# Patient Record
Sex: MALE | URBAN - METROPOLITAN AREA
[De-identification: names, ages, dates, MRNs, and addresses within clinical notes are randomized per-mention and may not be internally consistent; named-entity substitution may affect disease eponyms.]

---

## 2018-01-25 ENCOUNTER — OFFICE VISIT (OUTPATIENT)
Dept: FAMILY MEDICINE CLINIC | Age: 68
End: 2018-01-25

## 2018-01-25 ENCOUNTER — HOME HEALTH ADMISSION (OUTPATIENT)
Dept: HOME HEALTH SERVICES | Facility: HOME HEALTH | Age: 68
End: 2018-01-25
Payer: COMMERCIAL

## 2018-01-25 VITALS
TEMPERATURE: 97.7 F | OXYGEN SATURATION: 99 % | HEIGHT: 66 IN | HEART RATE: 105 BPM | BODY MASS INDEX: 18.8 KG/M2 | WEIGHT: 117 LBS | SYSTOLIC BLOOD PRESSURE: 134 MMHG | DIASTOLIC BLOOD PRESSURE: 85 MMHG | RESPIRATION RATE: 17 BRPM

## 2018-01-25 DIAGNOSIS — L03.114 LEFT ARM CELLULITIS: ICD-10-CM

## 2018-01-25 DIAGNOSIS — I10 ESSENTIAL HYPERTENSION: Primary | ICD-10-CM

## 2018-01-25 DIAGNOSIS — Z51.89 VISIT FOR WOUND CARE: ICD-10-CM

## 2018-01-25 RX ORDER — DOCUSATE SODIUM 100 MG/1
100 CAPSULE, LIQUID FILLED ORAL 2 TIMES DAILY
COMMUNITY
End: 2018-12-10

## 2018-01-25 RX ORDER — LANOLIN ALCOHOL/MO/W.PET/CERES
CREAM (GRAM) TOPICAL DAILY
COMMUNITY
End: 2018-12-10

## 2018-01-25 RX ORDER — CANE TIPS
EACH MISCELLANEOUS
COMMUNITY

## 2018-01-25 RX ORDER — CALCIUM CARBONATE 200(500)MG
1 TABLET,CHEWABLE ORAL DAILY
COMMUNITY
End: 2018-12-10

## 2018-01-25 RX ORDER — SENNOSIDES 25 MG/1
TABLET, FILM COATED ORAL
COMMUNITY
End: 2018-06-15

## 2018-01-25 RX ORDER — CLOTRIMAZOLE 10 MG/1
10 LOZENGE ORAL; TOPICAL
COMMUNITY
End: 2018-06-15

## 2018-01-25 RX ORDER — POTASSIUM CHLORIDE 750 MG/1
TABLET, FILM COATED, EXTENDED RELEASE ORAL
COMMUNITY
End: 2018-01-29

## 2018-01-25 RX ORDER — OMEPRAZOLE 20 MG/1
20 CAPSULE, DELAYED RELEASE ORAL DAILY
COMMUNITY
End: 2018-12-10

## 2018-01-25 RX ORDER — TAMSULOSIN HYDROCHLORIDE 0.4 MG/1
0.4 CAPSULE ORAL DAILY
COMMUNITY
End: 2018-12-10

## 2018-01-25 RX ORDER — CLONIDINE HYDROCHLORIDE 0.1 MG/1
TABLET ORAL 2 TIMES DAILY
COMMUNITY
End: 2018-06-15

## 2018-01-25 RX ORDER — POLYETHYLENE GLYCOL 3350
POWDER (GRAM) MISCELLANEOUS
COMMUNITY
End: 2018-01-31

## 2018-01-25 RX ORDER — LORAZEPAM 1 MG/1
TABLET ORAL
COMMUNITY
End: 2018-06-15

## 2018-01-25 RX ORDER — CEPHALEXIN 500 MG/1
500 CAPSULE ORAL 4 TIMES DAILY
COMMUNITY
End: 2018-02-21 | Stop reason: ALTCHOICE

## 2018-01-25 RX ORDER — ACETAMINOPHEN 325 MG/1
TABLET ORAL
COMMUNITY
End: 2020-10-26

## 2018-01-25 RX ORDER — GABAPENTIN 100 MG/1
100 CAPSULE ORAL 2 TIMES DAILY
Qty: 90 CAP | Refills: 1 | Status: SHIPPED | OUTPATIENT
Start: 2018-01-25 | End: 2018-12-10

## 2018-01-25 RX ORDER — GABAPENTIN 100 MG/1
100 CAPSULE ORAL 2 TIMES DAILY
COMMUNITY
End: 2018-01-25 | Stop reason: SDUPTHER

## 2018-01-25 NOTE — PROGRESS NOTES
Maria Esther Broussard is a 80 y.o. [address] male and presents with F/U hospitalization for alcohol induced pancreatitis  With complication of AMI x 2 apparently in hospital. He also has a large albeit superficial wounds on the left forearm as complication from  IV or injection in hospital.     Chief Complaint   Patient presents with   [facility] Follow Up     Subjective: Additional Concerns: none    There are no active problems to display for this patient. Current Outpatient Prescriptions   Medication Sig Dispense Refill    acetaminophen (TYLENOL) 325 mg tablet Take  by mouth every four (4) hours as needed for Pain.  calcium carbonate (TUMS) 200 mg calcium (500 mg) chew Take 1 Tab by mouth daily. [place] robin by Does Not Apply route.  cephALEXin (KEFLEX) 500 mg capsule Take 500 mg by mouth four (4) times daily.  cloNIDine HCl (CATAPRES) 0.1 mg tablet Take  by mouth two (2) times a day.  clotrimazole (MYCELEX) 10 mg leti Take 10 mg by mouth five (5) times daily.  docusate sodium (COLACE) 100 mg capsule Take 100 mg by mouth two (2) times a day.  gabapentin (NEURONTIN) 100 mg capsule Take 100 mg by mouth two (2) times a day.  lidocaine 5 % topical cream Apply  to affected area two (2) times daily as needed for Itching.  LORazepam (ATIVAN) 1 mg tablet Take  by mouth every four (4) hours as needed for Anxiety.  aluminum-magnesium hydroxide 200-200 mg/5 mL susp 5 mL, diphenhydrAMINE 12.5 mg/5 mL liqd 5 mL, lidocaine 2 % soln 5 mL 5 mL by Swish and Spit route two (2) times a day. Magic mouth wash   Maalox  Lidocaine 2% viscous   Diphenhydramine oral solution     Pharmacy to mix equal portions of ingredients to a total volume as indicated in the dispense amount.  omeprazole (PRILOSEC) 20 mg capsule Take 20 mg by mouth daily.  Polyethylene Glycol 3350 powd by Does Not Apply route.       potassium chloride SR (KLOR-CON 10) 10 mEq tablet Take by mouth.  tamsulosin (FLOMAX) 0.4 mg capsule Take 0.4 mg by mouth daily.  thiamine (VITAMIN B-1) 100 mg tablet Take  by mouth daily. Allergies   Allergen Reactions    Milk Containing Products Other (comments)     No past medical history on file. No past surgical history on file. No family history on file. Social History   Substance Use Topics    Smoking status: Not on file    Smokeless tobacco: Not on file    Alcohol use Not on file     ROS     General: negative for - chills, fatigue, fever, weight change  Psych: positive for - anxiety, depression, irritability or mood swings  Resp: negative for - cough, shortness of breath or wheezing  CV: negative for - chest pain, edema or palpitations  GI: negative for - abdominal pain, change in bowel habits, constipation, diarrhea or nausea/vomiting  Derm: negative for - dry skin, hair changes, rash or skin lesion changes, wound x 2    Objective:  Vitals:    01/25/18 0949   BP: 134/85   Pulse: (!) 105   Resp: 17   Temp: 97.7 °F (36.5 °C)   TempSrc: Oral   SpO2: 99%   Weight: 117 lb (53.1 kg)   Height: 5' 6\" (1.676 m)   PainSc:   1     PE    Alert, well appearing, and in no distress, oriented to person, place, and time and normal appearing weight  General appearance - alert, well appearing, and in no distress and oriented to person, place, and time  Chest - clear to auscultation, no wheezes, rales or rhonchi, symmetric air entry  Heart - normal rate, regular rhythm, normal S1, S2, no murmurs, rubs, clicks or gallops  Extremities - peripheral pulses normal, no pedal edema, no clubbing or cyanosis  Skin - left forearm superficial wound one small one healing with a large one about 2 inches square with scant scabbing and yellow exudate. LABS   No visits with results within 6 Month(s) from this visit.   Latest known visit with results is:    Results on 04/02/2010   Component Date Value Ref Range Status    Cholesterol, total 04/02/2010 301* 0 - 200 MG/DL Final    Triglyceride 04/02/2010 541* 0 - 150 MG/DL Final    HDL Cholesterol 04/02/2010 49  40 - 60 MG/DL Final    LDL, calculated 04/02/2010 LDL AND VLDL CHOLESTEROL NOT CALCULATED WHEN TRIGLYCERIDES >400 MG/DL OR HDL CHOLESTEROL <20 MG/DL  0 - 100 MG/DL Final    VLDL, calculated 04/02/2010 Not calculated due to elevated triglyceride level  MG/DL Final    CHOL/HDL Ratio 04/02/2010 6.1* 0 - 5.0   Final    LDL/HDL Ratio 04/02/2010 LDL AND VLDL CHOLESTEROL NOT CALCULATED WHEN TRIGLYCERIDES >400 MG/DL OR HDL CHOLESTEROL <20 MG/DL   Final    Comment:      LDL/HDL RISK FACTORS                           MEN     WOMEN        RISK FACTOR                           1.0      1.5      1/2 Average Risk                           3.6      3.2          Average Risk                           6.3      5.0      2 Times Average Risk                           8.0      6.1      3 Times Average Risk       TESTS  Results for orders placed or performed in visit on 04/02/10   LIPID PANEL   Result Value Ref Range    Cholesterol, total 301 (H) 0 - 200 MG/DL    Triglyceride 541 (H) 0 - 150 MG/DL    HDL Cholesterol 49 40 - 60 MG/DL    LDL, calculated  0 - 100 MG/DL     LDL AND VLDL CHOLESTEROL NOT CALCULATED WHEN TRIGLYCERIDES >400 MG/DL OR HDL CHOLESTEROL <20 MG/DL    VLDL, calculated Not calculated due to elevated triglyceride level MG/DL    CHOL/HDL Ratio 6.1 (H) 0 - 5.0      LDL/HDL Ratio       LDL AND VLDL CHOLESTEROL NOT CALCULATED WHEN TRIGLYCERIDES >400 MG/DL OR HDL CHOLESTEROL <20 MG/DL     Assessment/Plan:      1. Acute alcohol pancreatitis - F/U with GI. Patient not drinking at this time and encouraged not to try again. 2. Left forearm wound - Home Health for wound care. 3. AMI - F/U with cardiology. Encouraged to stop smoking and made available all options for quitting. Patient not willing at this time. Lab review: orders written for new lab studies as appropriate; see orders.      I have discussed the diagnosis with the patient and the intended plan as seen in the above orders. The patient has received an after-visit summary and questions were answered concerning future plans. I have discussed medication side effects and warnings with the patient as well. I have reviewed the plan of care with the patient, accepted their input and they are in agreement with the treatment goals. F/U in 3 months.      Ashlyn Peres MD

## 2018-01-25 NOTE — PATIENT INSTRUCTIONS
Cellulitis: Care Instructions  Your Care Instructions    Cellulitis is a skin infection. It often occurs after a break in the skin from a scrape, cut, bite, or puncture, or after a rash. The doctor has checked you carefully, but problems can develop later. If you notice any problems or new symptoms, get medical treatment right away. Follow-up care is a key part of your treatment and safety. Be sure to make and go to all appointments, and call your doctor if you are having problems. It's also a good idea to know your test results and keep a list of the medicines you take. How can you care for yourself at home? · Take your antibiotics as directed. Do not stop taking them just because you feel better. You need to take the full course of antibiotics. · Prop up the infected area on pillows to reduce pain and swelling. Try to keep the area above the level of your heart as often as you can. · If your doctor told you how to care for your wound, follow your doctor's instructions. If you did not get instructions, follow this general advice:  ¨ Wash the wound with clean water 2 times a day. Don't use hydrogen peroxide or alcohol, which can slow healing. ¨ You may cover the wound with a thin layer of petroleum jelly, such as Vaseline, and a nonstick bandage. ¨ Apply more petroleum jelly and replace the bandage as needed. · Be safe with medicines. Take pain medicines exactly as directed. ¨ If the doctor gave you a prescription medicine for pain, take it as prescribed. ¨ If you are not taking a prescription pain medicine, ask your doctor if you can take an over-the-counter medicine. To prevent cellulitis in the future  · Try to prevent cuts, scrapes, or other injuries to your skin. Cellulitis most often occurs where there is a break in the skin. · If you get a scrape, cut, mild burn, or bite, wash the wound with clean water as soon as you can to help avoid infection.  Don't use hydrogen peroxide or alcohol, which can slow healing. · If you have swelling in your legs (edema), support stockings and good skin care may help prevent leg sores and cellulitis. · Take care of your feet, especially if you have diabetes or other conditions that increase the risk of infection. Wear shoes and socks. Do not go barefoot. If you have athlete's foot or other skin problems on your feet, talk to your doctor about how to treat them. When should you call for help? Call your doctor now or seek immediate medical care if:  ? · You have signs that your infection is getting worse, such as:  ¨ Increased pain, swelling, warmth, or redness. ¨ Red streaks leading from the area. ¨ Pus draining from the area. ¨ A fever. ? · You get a rash. ? Watch closely for changes in your health, and be sure to contact your doctor if:  ? · You are not getting better after 1 day (24 hours). ? · You do not get better as expected. Where can you learn more? Go to http://davina-rekha.info/. Bekah Hernandez in the search box to learn more about \"Cellulitis: Care Instructions. \"  Current as of: October 13, 2016  Content Version: 11.4  © 5017-8804 [employer]. Care instructions adapted under license by [employer] (which disclaims liability or warranty for this information). If you have questions about a medical condition or this instruction, always ask your healthcare professional. [place] [address] any warranty or liability for your use of this information. Pancreatitis: Care Instructions  Your Care Instructions    The pancreas is an organ behind the stomach. It makes hormones and enzymes to help your body digest food. But if these enzymes attack the pancreas, it can get inflamed. This is called pancreatitis. Most cases are caused by gallstones or by heavy alcohol use. If you take care of yourself at home, it will help you get better.  It will also help you avoid more problems with your pancreas. Follow-up care is a key part of your treatment and safety. Be sure to make and go to all appointments, and call your doctor if you are having problems. It's also a good idea to know your test results and keep a list of the medicines you take. How can you care for yourself at home? · Drink clear liquids and eat bland foods until you feel better. [place] foods include rice, dry toast, and crackers. They also include bananas and applesauce. · Eat a low-fat diet until your doctor says your pancreas is healed. · Do not drink alcohol. Tell your doctor if you need help to quit. Counseling, support groups, and sometimes medicines can help you stay sober. · Be safe with medicines. Read and follow all instructions on the label. ¨ If the doctor gave you a prescription medicine for pain, take it as prescribed. ¨ If you are not taking a prescription pain medicine, ask your doctor if you can take an over-the-counter medicine. · If your doctor prescribed antibiotics, take them as directed. Do not stop taking them just because you feel better. You need to take the full course of antibiotics. · Get extra rest until you feel better. To prevent future problems with your pancreas  · Do not drink alcohol. · Tell your doctors and pharmacist that you've had pancreatitis. They can help you avoid medicines that may cause this problem again. When should you call for help? Call 911 anytime you think you may need emergency care. For example, call if:  ? · You vomit blood or what looks like coffee grounds. ? · Your stools are maroon or very bloody. ?Call your doctor now or seek immediate medical care if:  ? · You have new or worse belly pain. ? · Your stools are black and look like tar, or they have streaks of blood. ? · You are vomiting. ? Watch closely for changes in your health, and be sure to contact your doctor if:  ? · You do not get better as expected. Where can you learn more?   Go to http://davina-rekha.info/. Enter W735 in the search box to learn more about \"Pancreatitis: Care Instructions. \"  Current as of: May 12, 2017  Content Version: 11.4  © 1013-3567 [employer]. Care instructions adapted under license by [employer] (which disclaims liability or warranty for this information). If you have questions about a medical condition or this instruction, always ask your healthcare professional. [place] [address] any warranty or liability for your use of this information.

## 2018-01-25 NOTE — PROGRESS NOTES
Sukumar Bartholomew is a 80 y.o. male presents in office to establish care and for hospital f/u.      Previous medical care provider:

## 2018-01-29 ENCOUNTER — HOME CARE VISIT (OUTPATIENT)
Dept: HOME HEALTH SERVICES | Facility: HOME HEALTH | Age: 68
End: 2018-01-29

## 2018-01-29 ENCOUNTER — HOME CARE VISIT (OUTPATIENT)
Dept: SCHEDULING | Facility: HOME HEALTH | Age: 68
End: 2018-01-29
Payer: COMMERCIAL

## 2018-01-29 VITALS
DIASTOLIC BLOOD PRESSURE: 80 MMHG | RESPIRATION RATE: 16 BRPM | HEIGHT: 65 IN | WEIGHT: 117 LBS | TEMPERATURE: 96.6 F | HEART RATE: 82 BPM | SYSTOLIC BLOOD PRESSURE: 130 MMHG | BODY MASS INDEX: 19.49 KG/M2 | OXYGEN SATURATION: 98 %

## 2018-01-29 PROCEDURE — A6446 CONFORM BAND S W>=3" <5"/YD: HCPCS

## 2018-01-29 PROCEDURE — A4216 STERILE WATER/SALINE, 10 ML: HCPCS

## 2018-01-29 PROCEDURE — 400013 HH SOC

## 2018-01-29 PROCEDURE — G0495 RN CARE TRAIN/EDU IN HH: HCPCS

## 2018-01-30 ENCOUNTER — HOME CARE VISIT (OUTPATIENT)
Dept: SCHEDULING | Facility: HOME HEALTH | Age: 68
End: 2018-01-30
Payer: COMMERCIAL

## 2018-01-30 ENCOUNTER — HOME CARE VISIT (OUTPATIENT)
Dept: HOME HEALTH SERVICES | Facility: HOME HEALTH | Age: 68
End: 2018-01-30
Payer: COMMERCIAL

## 2018-01-30 VITALS
DIASTOLIC BLOOD PRESSURE: 84 MMHG | OXYGEN SATURATION: 99 % | HEART RATE: 101 BPM | TEMPERATURE: 97.9 F | SYSTOLIC BLOOD PRESSURE: 132 MMHG

## 2018-01-30 PROCEDURE — G0151 HHCP-SERV OF PT,EA 15 MIN: HCPCS

## 2018-01-31 ENCOUNTER — HOME CARE VISIT (OUTPATIENT)
Dept: SCHEDULING | Facility: HOME HEALTH | Age: 68
End: 2018-01-31
Payer: COMMERCIAL

## 2018-01-31 VITALS
HEART RATE: 85 BPM | TEMPERATURE: 96.6 F | DIASTOLIC BLOOD PRESSURE: 88 MMHG | RESPIRATION RATE: 14 BRPM | OXYGEN SATURATION: 99 % | SYSTOLIC BLOOD PRESSURE: 144 MMHG

## 2018-01-31 PROCEDURE — G0299 HHS/HOSPICE OF RN EA 15 MIN: HCPCS

## 2018-02-01 ENCOUNTER — HOME CARE VISIT (OUTPATIENT)
Dept: SCHEDULING | Facility: HOME HEALTH | Age: 68
End: 2018-02-01
Payer: COMMERCIAL

## 2018-02-01 PROCEDURE — G0157 HHC PT ASSISTANT EA 15: HCPCS

## 2018-02-02 ENCOUNTER — HOME CARE VISIT (OUTPATIENT)
Dept: SCHEDULING | Facility: HOME HEALTH | Age: 68
End: 2018-02-02
Payer: COMMERCIAL

## 2018-02-02 VITALS
DIASTOLIC BLOOD PRESSURE: 70 MMHG | TEMPERATURE: 97.1 F | HEART RATE: 83 BPM | SYSTOLIC BLOOD PRESSURE: 130 MMHG | OXYGEN SATURATION: 99 %

## 2018-02-02 VITALS
DIASTOLIC BLOOD PRESSURE: 80 MMHG | HEART RATE: 91 BPM | TEMPERATURE: 98.3 F | SYSTOLIC BLOOD PRESSURE: 122 MMHG | OXYGEN SATURATION: 99 %

## 2018-02-02 PROCEDURE — G0299 HHS/HOSPICE OF RN EA 15 MIN: HCPCS

## 2018-02-05 ENCOUNTER — TELEPHONE (OUTPATIENT)
Dept: FAMILY MEDICINE CLINIC | Age: 68
End: 2018-02-05

## 2018-02-05 ENCOUNTER — HOME CARE VISIT (OUTPATIENT)
Dept: SCHEDULING | Facility: HOME HEALTH | Age: 68
End: 2018-02-05
Payer: COMMERCIAL

## 2018-02-05 VITALS
TEMPERATURE: 96 F | RESPIRATION RATE: 16 BRPM | OXYGEN SATURATION: 98 % | DIASTOLIC BLOOD PRESSURE: 80 MMHG | SYSTOLIC BLOOD PRESSURE: 134 MMHG | HEART RATE: 80 BPM

## 2018-02-05 PROCEDURE — G0299 HHS/HOSPICE OF RN EA 15 MIN: HCPCS

## 2018-02-05 NOTE — TELEPHONE ENCOUNTER
Home care called because patient is complaining of itching around his wound. Home care stated that the wound is Scabbed over but patient is uncomfortable leaving it open to air at night because he is worried it will get caught on something and pull off. They would like a new medication of the skin during the day for the itching so pt can leave the wound open to air during the day and cover it at night. Please advise.

## 2018-02-06 ENCOUNTER — HOME CARE VISIT (OUTPATIENT)
Dept: SCHEDULING | Facility: HOME HEALTH | Age: 68
End: 2018-02-06
Payer: COMMERCIAL

## 2018-02-06 PROCEDURE — G0157 HHC PT ASSISTANT EA 15: HCPCS

## 2018-02-07 ENCOUNTER — HOME CARE VISIT (OUTPATIENT)
Dept: SCHEDULING | Facility: HOME HEALTH | Age: 68
End: 2018-02-07
Payer: COMMERCIAL

## 2018-02-07 VITALS
HEART RATE: 91 BPM | OXYGEN SATURATION: 98 % | DIASTOLIC BLOOD PRESSURE: 62 MMHG | TEMPERATURE: 98.5 F | SYSTOLIC BLOOD PRESSURE: 101 MMHG

## 2018-02-07 VITALS
RESPIRATION RATE: 18 BRPM | OXYGEN SATURATION: 98 % | DIASTOLIC BLOOD PRESSURE: 82 MMHG | SYSTOLIC BLOOD PRESSURE: 138 MMHG | HEART RATE: 87 BPM | TEMPERATURE: 97.3 F

## 2018-02-07 PROCEDURE — G0299 HHS/HOSPICE OF RN EA 15 MIN: HCPCS

## 2018-02-08 ENCOUNTER — HOME CARE VISIT (OUTPATIENT)
Dept: SCHEDULING | Facility: HOME HEALTH | Age: 68
End: 2018-02-08
Payer: COMMERCIAL

## 2018-02-08 PROCEDURE — G0157 HHC PT ASSISTANT EA 15: HCPCS

## 2018-02-08 RX ORDER — TRIAMCINOLONE ACETONIDE 5 MG/G
OINTMENT TOPICAL 2 TIMES DAILY
Qty: 30 G | Refills: 2 | Status: SHIPPED | OUTPATIENT
Start: 2018-02-08 | End: 2018-06-15

## 2018-02-08 NOTE — TELEPHONE ENCOUNTER
Pls pend kenalog 0.05% cream applied BID to intact skin only and not on directly on wound that is pruritic #30g x 2 refills

## 2018-02-08 NOTE — TELEPHONE ENCOUNTER
Please see pended rx for patient. Requested Prescriptions     Pending Prescriptions Disp Refills    triamcinolone acetonide (KENALOG) 0.5 % ointment 30 g 2     Sig: Apply  to affected area two (2) times a day.  use thin layer

## 2018-02-09 ENCOUNTER — HOME CARE VISIT (OUTPATIENT)
Dept: SCHEDULING | Facility: HOME HEALTH | Age: 68
End: 2018-02-09
Payer: COMMERCIAL

## 2018-02-09 VITALS
TEMPERATURE: 98.4 F | SYSTOLIC BLOOD PRESSURE: 142 MMHG | HEART RATE: 80 BPM | OXYGEN SATURATION: 99 % | DIASTOLIC BLOOD PRESSURE: 78 MMHG

## 2018-02-12 ENCOUNTER — HOME CARE VISIT (OUTPATIENT)
Dept: SCHEDULING | Facility: HOME HEALTH | Age: 68
End: 2018-02-12
Payer: COMMERCIAL

## 2018-02-12 VITALS
SYSTOLIC BLOOD PRESSURE: 134 MMHG | RESPIRATION RATE: 12 BRPM | OXYGEN SATURATION: 96 % | HEART RATE: 74 BPM | TEMPERATURE: 96.7 F | DIASTOLIC BLOOD PRESSURE: 82 MMHG

## 2018-02-12 PROCEDURE — G0299 HHS/HOSPICE OF RN EA 15 MIN: HCPCS

## 2018-02-13 ENCOUNTER — HOME CARE VISIT (OUTPATIENT)
Dept: SCHEDULING | Facility: HOME HEALTH | Age: 68
End: 2018-02-13
Payer: COMMERCIAL

## 2018-02-13 PROCEDURE — G0157 HHC PT ASSISTANT EA 15: HCPCS

## 2018-02-14 ENCOUNTER — HOME CARE VISIT (OUTPATIENT)
Dept: HOME HEALTH SERVICES | Facility: HOME HEALTH | Age: 68
End: 2018-02-14
Payer: COMMERCIAL

## 2018-02-14 VITALS
TEMPERATURE: 98.4 F | DIASTOLIC BLOOD PRESSURE: 85 MMHG | OXYGEN SATURATION: 97 % | HEART RATE: 99 BPM | SYSTOLIC BLOOD PRESSURE: 158 MMHG

## 2018-02-14 PROCEDURE — G0157 HHC PT ASSISTANT EA 15: HCPCS

## 2018-02-15 VITALS
SYSTOLIC BLOOD PRESSURE: 124 MMHG | DIASTOLIC BLOOD PRESSURE: 82 MMHG | TEMPERATURE: 97.6 F | OXYGEN SATURATION: 99 % | HEART RATE: 80 BPM

## 2018-02-21 ENCOUNTER — OFFICE VISIT (OUTPATIENT)
Dept: FAMILY MEDICINE CLINIC | Age: 68
End: 2018-02-21

## 2018-02-21 VITALS
OXYGEN SATURATION: 98 % | RESPIRATION RATE: 17 BRPM | BODY MASS INDEX: 19.53 KG/M2 | SYSTOLIC BLOOD PRESSURE: 124 MMHG | HEIGHT: 65 IN | TEMPERATURE: 97.4 F | WEIGHT: 117.2 LBS | DIASTOLIC BLOOD PRESSURE: 78 MMHG | HEART RATE: 84 BPM

## 2018-02-21 DIAGNOSIS — K21.00 GASTROESOPHAGEAL REFLUX DISEASE WITH ESOPHAGITIS: Primary | ICD-10-CM

## 2018-02-21 RX ORDER — RANITIDINE 15 MG/ML
SYRUP ORAL
Qty: 473 ML | Refills: 3 | Status: SHIPPED | OUTPATIENT
Start: 2018-02-21 | End: 2018-06-15

## 2018-02-21 NOTE — PROGRESS NOTES
Marlene Bryant is a 80 y.o. male presents to office for hospital follow up  1. Have you been to the ER, urgent care clinic or hospitalized since your last visit?  angela          Health Maintenance items with a due date reviewed with patient:  Health Maintenance Due   Topic Date Due    Hepatitis C Screening  1950    DTaP/Tdap/Td series (1 - Tdap) 01/01/1972    FOBT Q 1 YEAR AGE 50-75  01/01/2001    ZOSTER VACCINE AGE 60>  11/01/2010    GLAUCOMA SCREENING Q2Y  01/01/2016    Pneumococcal 65+ Low/Medium Risk (1 of 2 - PCV13) 01/01/2016    MEDICARE YEARLY EXAM  01/01/2016    Influenza Age 9 to Adult  08/01/2017

## 2018-02-22 NOTE — PROGRESS NOTES
Noelle Bhardwaj is a 80 y.o. [address] male and presents with ED visit F/U for acute epigastric pain that slowly goes up the sternal area. He recently was hospitalized for bilateral    Chief Complaint   Patient presents with   [facility] Follow Up     Subjective: Additional Concerns: none    Current Outpatient Prescriptions   Medication Sig Dispense Refill    raNITIdine (ZANTAC) 15 mg/mL syrup Take 2 tsp po BID  Indications: Heartburn 473 mL 3    hydrocortisone (PROCTOCORT) 1 % rectal cream Apply 1 Each to affected area daily as needed for Skin Irritation, Itching or Hemorrhoids. APPLY TO RECTAL AREA      potassium chloride SR (KLOR-CON 10) 10 mEq tablet Take 10 mEq by mouth daily.  folic acid (FOLVITE) 1 mg tablet Take 1 mg by mouth daily.  simethicone (GAS RELIEF) 180 mg cap Take 180 mg by mouth as needed (gas pain).  acetaminophen (TYLENOL) 160 mg/5 mL (5 mL) suspension Take 500 mg by mouth every four (4) hours as needed for Fever, Mild Pain, Moderate Pain or Headache.  albuterol (PROVENTIL HFA, VENTOLIN HFA, PROAIR HFA) 90 mcg/actuation inhaler Take 2 Puffs by inhalation every four (4) hours as needed for Wheezing or Shortness of Breath.  aspirin 81 mg chewable tablet Take 81 mg by mouth daily.  calcium carbonate (TUMS) 200 mg calcium (500 mg) chew Take 1 Tab by mouth daily. Susanna [place]ears robin by Does Not Apply route.  cloNIDine HCl (CATAPRES) 0.1 mg tablet Take  by mouth two (2) times a day.  clotrimazole (MYCELEX) 10 mg leti Take 10 mg by mouth five (5) times daily.  docusate sodium (COLACE) 100 mg capsule Take 100 mg by mouth two (2) times a day.  lidocaine 5 % topical cream Apply  to affected area two (2) times daily as needed for Itching.  LORazepam (ATIVAN) 1 mg tablet Take  by mouth every four (4) hours as needed for Anxiety.  omeprazole (PRILOSEC) 20 mg capsule Take 20 mg by mouth daily.       tamsulosin (FLOMAX) 0.4 mg capsule Take 0.4 mg by mouth daily.  thiamine (VITAMIN B-1) 100 mg tablet Take  by mouth daily.  gabapentin (NEURONTIN) 100 mg capsule Take 1 Cap by mouth two (2) times a day. 90 Cap 1    triamcinolone acetonide (KENALOG) 0.5 % ointment Apply  to affected area two (2) times a day. use thin layer 30 g 2    Polyethylene Glycol 3350 powd Take 17 g by mouth daily as needed (constipation).  acetaminophen (TYLENOL) 325 mg tablet Take  by mouth every four (4) hours as needed for Pain.  aluminum-magnesium hydroxide 200-200 mg/5 mL susp 5 mL, diphenhydrAMINE 12.5 mg/5 mL liqd 5 mL, lidocaine 2 % soln 5 mL 5 mL by Swish and Spit route two (2) times a day. Magic mouth wash   Maalox  Lidocaine 2% viscous   Diphenhydramine oral solution     Pharmacy to mix equal portions of ingredients to a total volume as indicated in the dispense amount. Allergies   Allergen Reactions    Milk Containing Products Other (comments)     History reviewed. No pertinent past medical history. History reviewed. No pertinent surgical history. History reviewed. No pertinent family history.   Social History   Substance Use Topics    Smoking status: Never Smoker    Smokeless tobacco: Never Used    Alcohol use No     ROS     General: negative for - chills, fatigue, fever, weight change  Psych: negative for - anxiety, depression, irritability or mood swings  ENT: negative for - headaches, hearing change, nasal congestion, oral lesions, sneezing or sore throat  Heme/ Lymph: negative for - bleeding problems, bruising, pallor or swollen lymph nodes  Endo: negative for - hot flashes, polydipsia/polyuria or temperature intolerance  Resp: negative for - cough, shortness of breath or wheezing  CV: negative for - chest pain, edema or palpitations  GI: negative for - abdominal pain, change in bowel habits, constipation, diarrhea or nausea/vomiting  : negative for - dysuria, hematuria, incontinence, pelvic pain or vulvar/vaginal symptoms  MSK: negative for - joint pain, joint swelling or muscle pain  Neuro: negative for - confusion, headaches, seizures or weakness  Derm: negative for - dry skin, hair changes, rash or skin lesion changes    Objective:  Vitals:    02/21/18 1558   BP: 124/78   Pulse: 84   Resp: 17   Temp: 97.4 °F (36.3 °C)   TempSrc: Oral   SpO2: 98%   Weight: 117 lb 3.2 oz (53.2 kg)   Height: 5' 5\" (1.651 m)   PainSc:   0 - No pain     PE    Alert, well appearing, and in no distress, oriented to person, place, and time and normal appearing weight  General appearance - alert, well appearing, and in no distress, oriented to person, place, and time and normal appearing weight  Mental status - alert, oriented to person, place, and time, normal mood, behavior, speech, dress, motor activity, and thought processes  Chest - clear to auscultation, no wheezes, rales or rhonchi, symmetric air entry  Heart - normal rate, regular rhythm, normal S1, S2, no murmurs, rubs, clicks or gallops  Abdomen - soft, nontender, nondistended, no masses or organomegaly  Extremities - peripheral pulses normal, no pedal edema, no clubbing or cyanosis    LABS   No visits with results within 6 Month(s) from this visit.   Latest known visit with results is:    Results on 04/02/2010   Component Date Value Ref Range Status    Cholesterol, total 04/02/2010 301* 0 - 200 MG/DL Final    Triglyceride 04/02/2010 541* 0 - 150 MG/DL Final    HDL Cholesterol 04/02/2010 49  40 - 60 MG/DL Final    LDL, calculated 04/02/2010 LDL AND VLDL CHOLESTEROL NOT CALCULATED WHEN TRIGLYCERIDES >400 MG/DL OR HDL CHOLESTEROL <20 MG/DL  0 - 100 MG/DL Final    VLDL, calculated 04/02/2010 Not calculated due to elevated triglyceride level  MG/DL Final    CHOL/HDL Ratio 04/02/2010 6.1* 0 - 5.0   Final    LDL/HDL Ratio 04/02/2010 LDL AND VLDL CHOLESTEROL NOT CALCULATED WHEN TRIGLYCERIDES >400 MG/DL OR HDL CHOLESTEROL <20 MG/DL   Final    Comment:      LDL/HDL RISK FACTORS MEN     WOMEN        RISK FACTOR                           1.0      1.5      1/2 Average Risk                           3.6      3.2          Average Risk                           6.3      5.0      2 Times Average Risk                           8.0      6.1      3 Times Average Risk       TESTS  Results for orders placed or performed in visit on 04/02/10   LIPID PANEL   Result Value Ref Range    Cholesterol, total 301 (H) 0 - 200 MG/DL    Triglyceride 541 (H) 0 - 150 MG/DL    HDL Cholesterol 49 40 - 60 MG/DL    LDL, calculated  0 - 100 MG/DL     LDL AND VLDL CHOLESTEROL NOT CALCULATED WHEN TRIGLYCERIDES >400 MG/DL OR HDL CHOLESTEROL <20 MG/DL    VLDL, calculated Not calculated due to elevated triglyceride level MG/DL    CHOL/HDL Ratio 6.1 (H) 0 - 5.0      LDL/HDL Ratio       LDL AND VLDL CHOLESTEROL NOT CALCULATED WHEN TRIGLYCERIDES >400 MG/DL OR HDL CHOLESTEROL <20 MG/DL     Assessment/Plan:      Gastroesophageal reflux disease with esophagitis symptoms  - REFERRAL TO GASTROENTEROLOGY also for pancreatitis. Changed Zantac from pill to syrup  - raNITIdine (ZANTAC) 15 mg/mL syrup; Take 2 tsp po BID  Indications: Heartburn  Dispense: 473 mL; Refill: 3    Lab review: no lab studies available for review at time of visit. I have discussed the diagnosis with the patient and the intended plan as seen in the above orders. The patient has received an after-visit summary and questions were answered concerning future plans. I have discussed medication side effects and warnings with the patient as well. I have reviewed the plan of care with the patient, accepted their input and they are in agreement with the treatment goals. F/U as needed.      Kisha Suarez MD

## 2018-02-22 NOTE — PATIENT INSTRUCTIONS

## 2018-03-14 ENCOUNTER — OFFICE VISIT (OUTPATIENT)
Dept: FAMILY MEDICINE CLINIC | Age: 68
End: 2018-03-14

## 2018-03-14 VITALS
OXYGEN SATURATION: 98 % | HEIGHT: 65 IN | BODY MASS INDEX: 19.49 KG/M2 | HEART RATE: 79 BPM | RESPIRATION RATE: 18 BRPM | DIASTOLIC BLOOD PRESSURE: 72 MMHG | SYSTOLIC BLOOD PRESSURE: 135 MMHG | TEMPERATURE: 96.6 F | WEIGHT: 117 LBS

## 2018-03-14 DIAGNOSIS — K85.20 ALCOHOL-INDUCED ACUTE PANCREATITIS, UNSPECIFIED COMPLICATION STATUS: Primary | ICD-10-CM

## 2018-03-14 RX ORDER — HYDROCODONE BITARTRATE AND ACETAMINOPHEN 5; 325 MG/1; MG/1
1 TABLET ORAL
Qty: 60 TAB | Refills: 0 | Status: SHIPPED | OUTPATIENT
Start: 2018-03-14 | End: 2018-12-10

## 2018-03-14 RX ORDER — HYDROCODONE BITARTRATE AND ACETAMINOPHEN 5; 325 MG/1; MG/1
TABLET ORAL
COMMUNITY
End: 2018-03-14 | Stop reason: SDUPTHER

## 2018-03-14 NOTE — PROGRESS NOTES
Brigida Coleman is a 80 y.o. [address] male and presents with F/U for 2 visits to ER due to abd/chest pain which is severe. No significant respiratory symptoms but had hx of bilateral pneumonia. He continues to smoke but does not drink alcohol anymore. He is not ready to quit at this time. He was found to have increased fluid collection peripancreatically on most recent CT scan done in ER. He continues to have bouts of epigastric severe pain. Hx of chronic pancreatitis due to alcohol. Chief Complaint   Patient presents with   [facility] Follow Up     Subjective: Additional Concerns: none     Current Outpatient Prescriptions   Medication Sig Dispense Refill    HYDROcodone-acetaminophen (NORCO) 5-325 mg per tablet Take 1 Tab by mouth every six (6) hours as needed for Pain. Max Daily Amount: 4 Tabs. 60 Tab 0    triamcinolone acetonide (KENALOG) 0.5 % ointment Apply  to affected area two (2) times a day. use thin layer 30 g 2    hydrocortisone (PROCTOCORT) 1 % rectal cream Apply 1 Each to affected area daily as needed for Skin Irritation, Itching or Hemorrhoids. APPLY TO RECTAL AREA      potassium chloride SR (KLOR-CON 10) 10 mEq tablet Take 10 mEq by mouth daily.  folic acid (FOLVITE) 1 mg tablet Take 1 mg by mouth daily.  simethicone (GAS RELIEF) 180 mg cap Take 180 mg by mouth as needed (gas pain).  acetaminophen (TYLENOL) 160 mg/5 mL (5 mL) suspension Take 500 mg by mouth every four (4) hours as needed for Fever, Mild Pain, Moderate Pain or Headache.  albuterol (PROVENTIL HFA, VENTOLIN HFA, PROAIR HFA) 90 mcg/actuation inhaler Take 2 Puffs by inhalation every four (4) hours as needed for Wheezing or Shortness of Breath. Mg [place] robin by Does Not Apply route.  cloNIDine HCl (CATAPRES) 0.1 mg tablet Take  by mouth two (2) times a day.  clotrimazole (MYCELEX) 10 mg leti Take 10 mg by mouth five (5) times daily.       docusate sodium (COLACE) 100 mg capsule Take 100 mg by mouth two (2) times a day.  lidocaine 5 % topical cream Apply  to affected area two (2) times daily as needed for Itching.  omeprazole (PRILOSEC) 20 mg capsule Take 20 mg by mouth daily.  tamsulosin (FLOMAX) 0.4 mg capsule Take 0.4 mg by mouth daily.  thiamine (VITAMIN B-1) 100 mg tablet Take  by mouth daily.  gabapentin (NEURONTIN) 100 mg capsule Take 1 Cap by mouth two (2) times a day. 90 Cap 1    raNITIdine (ZANTAC) 15 mg/mL syrup Take 2 tsp po BID  Indications: Heartburn 473 mL 3    Polyethylene Glycol 3350 powd Take 17 g by mouth daily as needed (constipation).  aspirin 81 mg chewable tablet Take 81 mg by mouth daily.  acetaminophen (TYLENOL) 325 mg tablet Take  by mouth every four (4) hours as needed for Pain.  calcium carbonate (TUMS) 200 mg calcium (500 mg) chew Take 1 Tab by mouth daily.  LORazepam (ATIVAN) 1 mg tablet Take  by mouth every four (4) hours as needed for Anxiety.  aluminum-magnesium hydroxide 200-200 mg/5 mL susp 5 mL, diphenhydrAMINE 12.5 mg/5 mL liqd 5 mL, lidocaine 2 % soln 5 mL 5 mL by Swish and Spit route two (2) times a day. Magic mouth wash   Maalox  Lidocaine 2% viscous   Diphenhydramine oral solution     Pharmacy to mix equal portions of ingredients to a total volume as indicated in the dispense amount. Allergies   Allergen Reactions    Milk Containing Products Other (comments)     History reviewed. No pertinent past medical history. History reviewed. No pertinent surgical history. History reviewed. No pertinent family history.   Social History   Substance Use Topics    Smoking status: Never Smoker    Smokeless tobacco: Never Used    Alcohol use No     ROS     General: negative for - chills, fatigue, fever, weight change  Endo: negative for - hot flashes, polydipsia/polyuria or temperature intolerance  Resp: negative for - cough, shortness of breath or wheezing  CV: negative for - chest pain, edema or palpitations  GI: positive for - abdominal pain, no change in bowel habits, constipation, diarrhea or nausea/vomiting  : negative for - dysuria, hematuria, incontinence  Neuro: negative for - confusion, headaches, seizures or weakness    Objective:  Vitals:    03/14/18 1538   BP: 135/72   Pulse: 79   Resp: 18   Temp: 96.6 °F (35.9 °C)   TempSrc: Oral   SpO2: 98%   Weight: 117 lb (53.1 kg)   Height: 5' 5\" (1.651 m)   PainSc:   0 - No pain     PE    alert, well appearing, and in no distress, oriented to person, place, and time and normal appearing weight  General appearance - alert, well appearing, and in no distress and oriented to person, place, and time  Mental status - alert, oriented to person, place, and time, normal mood, behavior, speech, dress, motor activity, and thought processes  Chest - clear to auscultation, no wheezes, rales or rhonchi, symmetric air entry  Heart - normal rate, regular rhythm, normal S1, S2, no murmurs, rubs, clicks or gallops  Extremities - peripheral pulses normal, no pedal edema, no clubbing or cyanosis    LABS   No visits with results within 6 Month(s) from this visit.   Latest known visit with results is:    Results on 04/02/2010   Component Date Value Ref Range Status    Cholesterol, total 04/02/2010 301* 0 - 200 MG/DL Final    Triglyceride 04/02/2010 541* 0 - 150 MG/DL Final    HDL Cholesterol 04/02/2010 49  40 - 60 MG/DL Final    LDL, calculated 04/02/2010 LDL AND VLDL CHOLESTEROL NOT CALCULATED WHEN TRIGLYCERIDES >400 MG/DL OR HDL CHOLESTEROL <20 MG/DL  0 - 100 MG/DL Final    VLDL, calculated 04/02/2010 Not calculated due to elevated triglyceride level  MG/DL Final    CHOL/HDL Ratio 04/02/2010 6.1* 0 - 5.0   Final    LDL/HDL Ratio 04/02/2010 LDL AND VLDL CHOLESTEROL NOT CALCULATED WHEN TRIGLYCERIDES >400 MG/DL OR HDL CHOLESTEROL <20 MG/DL   Final    Comment:      LDL/HDL RISK FACTORS                           MEN     WOMEN        RISK FACTOR 1.0      1.5      1/2 Average Risk                           3.6      3.2          Average Risk                           6.3      5.0      2 Times Average Risk                           8.0      6.1      3 Times Average Risk       TESTS  Results for orders placed or performed in visit on 04/02/10   LIPID PANEL   Result Value Ref Range    Cholesterol, total 301 (H) 0 - 200 MG/DL    Triglyceride 541 (H) 0 - 150 MG/DL    HDL Cholesterol 49 40 - 60 MG/DL    LDL, calculated  0 - 100 MG/DL     LDL AND VLDL CHOLESTEROL NOT CALCULATED WHEN TRIGLYCERIDES >400 MG/DL OR HDL CHOLESTEROL <20 MG/DL    VLDL, calculated Not calculated due to elevated triglyceride level MG/DL    CHOL/HDL Ratio 6.1 (H) 0 - 5.0      LDL/HDL Ratio       LDL AND VLDL CHOLESTEROL NOT CALCULATED WHEN TRIGLYCERIDES >400 MG/DL OR HDL CHOLESTEROL <20 MG/DL     Assessment/Plan:      Alcohol-induced acute pancreatitis, unspecified complication status - Patient asked to see GI specialist for further treatment  With severe bouts of abd pain- HYDROcodone-acetaminophen (NORCO) 5-325 mg per tablet; Take 1 Tab by mouth every six (6) hours as needed for Pain. Max Daily Amount: 4 Tabs. Dispense: 60 Tab; Refill: 0    Lab review: no lab studies available for review at time of visit    I have discussed the diagnosis with the patient and the intended plan as seen in the above orders. The patient has received an after-visit summary and questions were answered concerning future plans. I have discussed medication side effects and warnings with the patient as well. I have reviewed the plan of care with the patient, accepted their input and they are in agreement with the treatment goals. F/U in one to 3 months.      Dina Ramirez MD

## 2018-03-14 NOTE — MR AVS SNAPSHOT
[address] [place] [id number] 
[phone] Patient: [place] MRN: [id number] CIM:7/5/1955 Visit Information Date & Time Provider Department Dept. Phone Encounter #  
 3/14/2018  3:30 PM Khoi Lange MD [facility] [place] [phone] [id number] Upcoming Health Maintenance Date Due Hepatitis C Screening 1950 DTaP/Tdap/Td series (1 - Tdap) 1/1/1972 FOBT Q 1 YEAR AGE 50-75 1/1/2001 ZOSTER VACCINE AGE 60> 11/1/2010 GLAUCOMA SCREENING Q2Y 1/1/2016 Pneumococcal 65+ Low/Medium Risk (1 of 2 - PCV13) 1/1/2016 MEDICARE YEARLY EXAM 1/1/2016 Influenza Age 5 to Adult 8/1/2017 Allergies as of 3/14/2018  Review Complete On: 2/21/2018 By: Anupama Hinton LPN Severity Noted Reaction Type Reactions Milk Containing Products  07/31/2009    Other (comments) Current Immunizations  Never Reviewed No immunizations on file. Not reviewed this visit You Were Diagnosed With   
  
 Codes Comments Alcohol-induced acute pancreatitis, unspecified complication status    -  Primary ICD-10-CM: O62.27 ICD-9-CM: 856.2 Vitals BP Pulse Temp Resp Height(growth percentile) Weight(growth percentile) 135/72 79 96.6 °F (35.9 °C) (Oral) 18 5' 5\" (1.651 m) 117 lb (53.1 kg) SpO2 BMI Smoking Status 98% 19.47 kg/m2 Never Smoker BMI and BSA Data Body Mass Index Body Surface Area  
 19.47 kg/m 2 1.56 m 2 [facility] [phone] [phone] Your Updated Medication List  
  
   
This list is accurate as of 3/14/18  4:02 PM.  Always use your most recent med list.  
  
  
  
  
 * acetaminophen 325 mg tablet Commonly known as:  TYLENOL Take  by mouth every four (4) hours as needed for Pain. * acetaminophen 160 mg/5 mL (5 mL) suspension Commonly known as:  TYLENOL Take 500 mg by mouth every four (4) hours as needed for Fever, Mild Pain, Moderate Pain or Headache. albuterol 90 mcg/actuation inhaler Commonly known as:  PROVENTIL HFA, VENTOLIN HFA, PROAIR HFA Take 2 Puffs by inhalation every four (4) hours as needed for Wheezing or Shortness of Breath. aluminum-magnesium hydroxide 200-200 mg/5 mL susp 5 mL, diphenhydrAMINE 12.5 mg/5 mL liqd 5 mL, lidocaine 2 % soln 5 mL  
5 mL by Swish and Spit route two (2) times a day. Magic mouth wash  Maalox Lidocaine 2% viscous  Diphenhydramine oral solution   Pharmacy to mix equal portions of ingredients to a total volume as indicated in the dispense amount. aspirin 81 mg chewable tablet Take 81 mg by mouth daily. calcium carbonate 200 mg calcium (500 mg) Chew Commonly known as:  TUMS Take 1 Tab by mouth daily. Josie [place]  
by Does Not Apply route. cloNIDine HCl 0.1 mg tablet Commonly known as:  CATAPRES Take  by mouth two (2) times a day. clotrimazole 10 mg leti Commonly known as:  [place] Jose Alfredo Take 10 mg by mouth five (5) times daily. COLACE 100 mg capsule Generic drug:  docusate sodium Take 100 mg by mouth two (2) times a day. folic acid 1 mg tablet Commonly known as:  Google Take 1 mg by mouth daily. gabapentin 100 mg capsule Commonly known as:  NEURONTIN Take 1 Cap by mouth two (2) times a day. GAS RELIEF 180 mg Cap Generic drug:  simethicone Take 180 mg by mouth as needed (gas pain). HYDROcodone-acetaminophen 5-325 mg per tablet Commonly known as:  [place] Take 1 Tab by mouth every six (6) hours as needed for Pain. Max Daily Amount: 4 Tabs. hydrocortisone 1 % rectal cream  
Commonly known as:  PROCTOCORT Apply 1 Each to affected area daily as needed for Skin Irritation, Itching or Hemorrhoids. APPLY TO RECTAL AREA lidocaine 5 % topical cream  
Apply  to affected area two (2) times daily as needed for Itching. LORazepam 1 mg tablet Commonly known as:  ATIVAN Take  by mouth every four (4) hours as needed for Anxiety. omeprazole 20 mg capsule Commonly known as:  PRILOSEC Take 20 mg by mouth daily. Polyethylene Glycol 3350 Powd Take 17 g by mouth daily as needed (constipation). potassium chloride SR 10 mEq tablet Commonly known as:  KLOR-CON 10 Take 10 mEq by mouth daily. raNITIdine 15 mg/mL syrup Commonly known as:  ZANTAC Take 2 tsp po BID  Indications: Heartburn  
  
 tamsulosin 0.4 mg capsule Commonly known as:  FLOMAX Take 0.4 mg by mouth daily. triamcinolone acetonide 0.5 % ointment Commonly known as:  KENALOG Apply  to affected area two (2) times a day. use thin layer VITAMIN B-1 100 mg tablet Generic drug:  thiamine Take  by mouth daily. * Notice: This list has 2 medication(s) that are the same as other medications prescribed for you. Read the directions carefully, and ask your doctor or other care provider to review them with you. Prescriptions Printed Refills HYDROcodone-acetaminophen (NORCO) 5-325 mg per tablet 0 Sig: Take 1 Tab by mouth every six (6) hours as needed for Pain. Max Daily Amount: 4 Tabs. Class: Print Route: Oral  
  
Introducing [facility]! Gabriella Guzman introduces [employer] patient portal. Now you can access parts of your medical record, email your doctor's office, and request medication refills online. 1. In your internet browser, go to [email or web address] 2. Click on the First Time User? Click Here link in the Sign In box. You will see the New Member Sign Up page. 3. Enter your [employer] Access Code exactly as it appears below. You will not need to use this code after youve completed the sign-up process.  If you do not sign up before the expiration date, you must request a new code. · [employer] Access Code: [email or web address] Expires: 4/26/2018  9:44 PM 
 
4. Enter the last four digits of your Social Security Number (xxxx) and Date of Birth (mm/dd/yyyy) as indicated and click Submit. You will be taken to the next sign-up page. 5. Create a [employer] ID. This will be your [employer] login ID and cannot be changed, so think of one that is secure and easy to remember. 6. Create a [employer] password. You can change your password at any time. 7. Enter your Password Reset Question and Answer. This can be used at a later time if you forget your password. 8. Enter your e-mail address. You will receive e-mail notification when new information is available in [address] 9. Click Sign Up. You can now view and download portions of your medical record. 10. Click the Download Summary menu link to download a portable copy of your medical information. If you have questions, please visit the Frequently Asked Questions section of the [employer] website. Remember, [employer] is NOT to be used for urgent needs. For medical emergencies, dial 911. Now available from your iPhone and Android! Please provide this summary of care documentation to your next provider. Your primary care clinician is listed as Hayley Fall. If you have any questions after today's visit, please call [phone].

## 2018-03-14 NOTE — PROGRESS NOTES
Noelle Bhardwaj is a 80 y.o. male presents to office for hospital follow up          Health Maintenance items with a due date reviewed with patient:  Health Maintenance Due   Topic Date Due    Hepatitis C Screening  1950    DTaP/Tdap/Td series (1 - Tdap) 01/01/1972    FOBT Q 1 YEAR AGE 50-75  01/01/2001    ZOSTER VACCINE AGE 60>  11/01/2010    GLAUCOMA SCREENING Q2Y  01/01/2016    Pneumococcal 65+ Low/Medium Risk (1 of 2 - PCV13) 01/01/2016    MEDICARE YEARLY EXAM  01/01/2016    Influenza Age 9 to Adult  08/01/2017

## 2018-03-15 NOTE — PATIENT INSTRUCTIONS
Pancreatitis: Care Instructions  Your Care Instructions    The pancreas is an organ behind the stomach. It makes hormones and enzymes to help your body digest food. But if these enzymes attack the pancreas, it can get inflamed. This is called pancreatitis. Most cases are caused by gallstones or by heavy alcohol use. If you take care of yourself at home, it will help you get better. It will also help you avoid more problems with your pancreas. Follow-up care is a key part of your treatment and safety. Be sure to make and go to all appointments, and call your doctor if you are having problems. It's also a good idea to know your test results and keep a list of the medicines you take. How can you care for yourself at home? · Drink clear liquids and eat bland foods until you feel better. Becker foods include rice, dry toast, and crackers. They also include bananas and applesauce. · Eat a low-fat diet until your doctor says your pancreas is healed. · Do not drink alcohol. Tell your doctor if you need help to quit. Counseling, support groups, and sometimes medicines can help you stay sober. · Be safe with medicines. Read and follow all instructions on the label. ¨ If the doctor gave you a prescription medicine for pain, take it as prescribed. ¨ If you are not taking a prescription pain medicine, ask your doctor if you can take an over-the-counter medicine. · If your doctor prescribed antibiotics, take them as directed. Do not stop taking them just because you feel better. You need to take the full course of antibiotics. · Get extra rest until you feel better. To prevent future problems with your pancreas  · Do not drink alcohol. · Tell your doctors and pharmacist that you've had pancreatitis. They can help you avoid medicines that may cause this problem again. When should you call for help? Call 911 anytime you think you may need emergency care.  For example, call if:  ? · You vomit blood or what looks like coffee grounds. ? · Your stools are maroon or very bloody. ?Call your doctor now or seek immediate medical care if:  ? · You have new or worse belly pain. ? · Your stools are black and look like tar, or they have streaks of blood. ? · You are vomiting. ? Watch closely for changes in your health, and be sure to contact your doctor if:  ? · You do not get better as expected. Where can you learn more? Go to http://davina-rekha.info/. Enter T291 in the search box to learn more about \"Pancreatitis: Care Instructions. \"  Current as of: May 12, 2017  Content Version: 11.4  © 8252-6778 [employer]. Care instructions adapted under license by [employer] (which disclaims liability or warranty for this information). If you have questions about a medical condition or this instruction, always ask your healthcare professional. [place] [address] any warranty or liability for your use of this information. Chronic Pain: Care Instructions  Your Care Instructions    Chronic pain is pain that lasts a long time (months or even years) and may or may not have a clear cause. It is different from acute pain, which usually does have a clear cause-like an injury or illness-and gets better over time. Chronic pain:  · Lasts over time but may vary from day to day. · Does not go away despite efforts to end it. · May disrupt your sleep and lead to fatigue. · May cause depression or anxiety. · May make your muscles tense, causing more pain. · Can disrupt your work, hobbies, home life, and relationships with friends and family. Chronic pain is a very real condition. It is not just in your head. Treatment can help and usually includes several methods used together, such as medicines, physical therapy, exercise, and other treatments. Learning how to relax and changing negative thought patterns can also help you cope. Chronic pain is complex.  Taking an active role in your treatment will help you better manage your pain. Tell your doctor if you have trouble dealing with your pain. You may have to try several things before you find what works best for you. Follow-up care is a key part of your treatment and safety. Be sure to make and go to all appointments, and call your doctor if you are having problems. It's also a good idea to know your test results and keep a list of the medicines you take. How can you care for yourself at home? · Pace yourself. Break up large jobs into smaller tasks. Save harder tasks for days when you have less pain, or go back and forth between hard tasks and easier ones. Take rest breaks. · Relax, and reduce stress. Relaxation techniques such as deep breathing or meditation can help. · Keep moving. Gentle, daily exercise can help reduce pain over the long run. Try low- or no-impact exercises such as walking, swimming, and stationary biking. Do stretches to stay flexible. · Try heat, cold packs, and massage. · Get enough sleep. Chronic pain can make you tired and drain your energy. Talk with your doctor if you have trouble sleeping because of pain. · Think positive. Your thoughts can affect your pain level. Do things that you enjoy to distract yourself when you have pain instead of focusing on the pain. See a movie, read a book, listen to music, or spend time with a friend. · If you think you are depressed, talk to your doctor about treatment. · Keep a daily pain diary. Record how your moods, thoughts, sleep patterns, activities, and medicine affect your pain. You may find that your pain is worse during or after certain activities or when you are feeling a certain emotion. Having a record of your pain can help you and your doctor find the best ways to treat your pain. · Take pain medicines exactly as directed. ¨ If the doctor gave you a prescription medicine for pain, take it as prescribed.   ¨ If you are not taking a prescription pain medicine, ask your doctor if you can take an over-the-counter medicine. Reducing constipation caused by pain medicine  · Include fruits, vegetables, beans, and whole grains in your diet each day. These foods are high in fiber. · Drink plenty of fluids, enough so that your urine is light yellow or clear like water. If you have kidney, heart, or liver disease and have to limit fluids, talk with your doctor before you increase the amount of fluids you drink. · If your doctor recommends it, get more exercise. Walking is a good choice. Bit by bit, increase the amount you walk every day. Try for at least 30 minutes on most days of the week. · Schedule time each day for a bowel movement. A daily routine may help. Take your time and do not strain when having a bowel movement. When should you call for help? Call your doctor now or seek immediate medical care if:  ? · Your pain gets worse or is out of control. ? · You feel down or blue, or you do not enjoy things like you once did. You may be depressed, which is common in people with chronic pain. Depression can be treated. ? · You have vomiting or cramps for more than 2 hours. ? Watch closely for changes in your health, and be sure to contact your doctor if:  ? · You cannot sleep because of pain. ? · You are very worried or anxious about your pain. ? · You have trouble taking your pain medicine. ? · You have any concerns about your pain medicine. ? · You have trouble with bowel movements, such as:  ¨ No bowel movement in 3 days. ¨ Blood in the anal area, in your stool, or on the toilet paper. ¨ Diarrhea for more than 24 hours. Where can you learn more? Go to http://davina-rekha.info/. Enter N004 in the search box to learn more about \"Chronic Pain: Care Instructions. \"  Current as of: October 14, 2016  Content Version: 11.4  © 3858-2274 [employer]  Care instructions adapted under license by [employer] (which disclaims liability or warranty for this information). If you have questions about a medical condition or this instruction, always ask your healthcare professional. [address] any warranty or liability for your use of this information.

## 2018-04-27 ENCOUNTER — OFFICE VISIT (OUTPATIENT)
Dept: FAMILY MEDICINE CLINIC | Age: 68
End: 2018-04-27

## 2018-04-27 VITALS
WEIGHT: 112.8 LBS | HEIGHT: 65 IN | BODY MASS INDEX: 18.8 KG/M2 | DIASTOLIC BLOOD PRESSURE: 73 MMHG | HEART RATE: 96 BPM | SYSTOLIC BLOOD PRESSURE: 127 MMHG | TEMPERATURE: 99.9 F | RESPIRATION RATE: 18 BRPM | OXYGEN SATURATION: 98 %

## 2018-04-27 DIAGNOSIS — D50.8 OTHER IRON DEFICIENCY ANEMIA: Primary | ICD-10-CM

## 2018-04-27 RX ORDER — OXYCODONE HYDROCHLORIDE 5 MG/1
5 TABLET ORAL
COMMUNITY
Start: 2018-04-24 | End: 2018-05-04

## 2018-04-27 RX ORDER — AMLODIPINE BESYLATE 5 MG/1
5 TABLET ORAL DAILY
COMMUNITY
Start: 2018-04-25 | End: 2018-06-15 | Stop reason: SDUPTHER

## 2018-04-27 NOTE — PROGRESS NOTES
Elsie Hackett is a 80 y.o. male presents to office for hospital follow up        Health Maintenance items with a due date reviewed with patient:  Health Maintenance Due   Topic Date Due    Hepatitis C Screening  1950    DTaP/Tdap/Td series (1 - Tdap) 01/01/1972    FOBT Q 1 YEAR AGE 50-75  01/01/2001    ZOSTER VACCINE AGE 60>  11/01/2010    GLAUCOMA SCREENING Q2Y  01/01/2016    Pneumococcal 65+ Low/Medium Risk (1 of 2 - PCV13) 01/01/2016

## 2018-04-27 NOTE — PATIENT INSTRUCTIONS
Iron Deficiency Anemia: Care Instructions  Your Care Instructions    Anemia means that you do not have enough red blood cells. Red blood cells carry oxygen around your body. When you have anemia, it can make you pale, weak, and tired. Many things can cause anemia. The most common cause is loss of blood. This can happen if you have heavy menstrual periods. It can also happen if you have bleeding in your stomach or bowel. You can also get anemia if you don't have enough iron in your diet or if it's hard for your body to absorb iron. In some cases, pregnancy causes anemia. That's because a pregnant woman needs more iron. Your doctor may do more tests to find the cause of your anemia. If a disease or other health problem is causing it, your doctor will treat that problem. It's important to follow up with your doctor to make sure that your iron level returns to normal.  Follow-up care is a key part of your treatment and safety. Be sure to make and go to all appointments, and call your doctor if you are having problems. It's also a good idea to know your test results and keep a list of the medicines you take. How can you care for yourself at home? · If your doctor recommended iron pills, take them as directed. ¨ Try to take the pills on an empty stomach. You can do this about 1 hour before or 2 hours after meals. But you may need to take iron with food to avoid an upset stomach. ¨ Do not take antacids or drink milk or anything with caffeine within 2 hours of when you take your iron. They can keep your body from absorbing the iron well. ¨ Vitamin C helps your body absorb iron. You may want to take iron pills with a glass of orange juice or some other food high in vitamin C.  ¨ Iron pills may cause stomach problems. These include heartburn, nausea, diarrhea, constipation, and cramps. It can help to drink plenty of fluids and include fruits, vegetables, and fiber in your diet.   ¨ It's normal for iron pills to make your stool a greenish or grayish black. But internal bleeding can also cause dark stool. So it's important to tell your doctor about any color changes. ¨ Call your doctor if you think you are having a problem with your iron pills. Even after you start to feel better, it will take several months for your body to build up its supply of iron. ¨ If you miss a pill, don't take a double dose. ¨ Keep iron pills out of the reach of small children. Too much iron can be very dangerous. · Eat foods with a lot of iron. These include red meat, shellfish, poultry, and eggs. They also include beans, raisins, whole-grain bread, and leafy green vegetables. · Steam your vegetables. This is the best way to prepare them if you want to get as much iron as possible. · Be safe with medicines. Do not take nonsteroidal anti-inflammatory pain relievers unless your doctor tells you to. These include aspirin, naproxen (Aleve), and ibuprofen (Advil, Motrin). · Liquid iron can stain your teeth. But you can mix it with water or juice and drink it with a straw. Then it won't get on your teeth. When should you call for help? Call 911 anytime you think you may need emergency care. For example, call if:  ? · You passed out (lost consciousness). ?Call your doctor now or seek immediate medical care if:  ? · You are short of breath. ? · You are dizzy or light-headed, or you feel like you may faint. ? · You have new or worse bleeding. ? Watch closely for changes in your health, and be sure to contact your doctor if:  ? · You feel weaker or more tired than usual.   ? · You do not get better as expected. Where can you learn more? Go to http://davina-rekha.info/. Enter D213 in the search box to learn more about \"Iron Deficiency Anemia: Care Instructions. \"  Current as of: October 13, 2016  Content Version: 11.4  © 8115-8906 [employer], [employer].  Care instructions adapted under license by [employer] (which disclaims liability or warranty for this information). If you have questions about a medical condition or this instruction, always ask your healthcare professional. [address] any warranty or liability for your use of this information.

## 2018-04-27 NOTE — PROGRESS NOTES
Octavio Conti is a 80 y.o. [address] male and presents with F/U for a stent placement for chronic pancreatitis with bleeding and   esulting acute on chronic anemia. Patient appears stable and denies any complaint. Chief Complaint   Patient presents with   [facility] Follow Up     Subjective: Additional Concerns: none     Current Outpatient Prescriptions   Medication Sig Dispense Refill    amLODIPine (NORVASC) 5 mg tablet Take 5 mg by mouth daily.  lipase-protease-amylase (CREON 60479) 24,000-76,000 -120,000 unit capsule Take 1 Cap by Mouth Three Times Daily with Meals.  oxyCODONE IR (ROXICODONE) 5 mg immediate release tablet 5 mg.  HYDROcodone-acetaminophen (NORCO) 5-325 mg per tablet Take 1 Tab by mouth every six (6) hours as needed for Pain. Max Daily Amount: 4 Tabs. 60 Tab 0    raNITIdine (ZANTAC) 15 mg/mL syrup Take 2 tsp po BID  Indications: Heartburn 473 mL 3    triamcinolone acetonide (KENALOG) 0.5 % ointment Apply  to affected area two (2) times a day. use thin layer 30 g 2    hydrocortisone (PROCTOCORT) 1 % rectal cream Apply 1 Each to affected area daily as needed for Skin Irritation, Itching or Hemorrhoids. APPLY TO RECTAL AREA      Polyethylene Glycol 3350 powd Take 17 g by mouth daily as needed (constipation).  potassium chloride SR (KLOR-CON 10) 10 mEq tablet Take 10 mEq by mouth daily.  folic acid (FOLVITE) 1 mg tablet Take 1 mg by mouth daily.  simethicone (GAS RELIEF) 180 mg cap Take 180 mg by mouth as needed (gas pain).  acetaminophen (TYLENOL) 160 mg/5 mL (5 mL) suspension Take 500 mg by mouth every four (4) hours as needed for Fever, Mild Pain, Moderate Pain or Headache.  albuterol (PROVENTIL HFA, VENTOLIN HFA, PROAIR HFA) 90 mcg/actuation inhaler Take 2 Puffs by inhalation every four (4) hours as needed for Wheezing or Shortness of Breath.  aspirin 81 mg chewable tablet Take 81 mg by mouth daily.       acetaminophen (TYLENOL) 325 mg tablet Take  by mouth every four (4) hours as needed for Pain.  calcium carbonate (TUMS) 200 mg calcium (500 mg) chew Take 1 Tab by mouth daily.  cloNIDine HCl (CATAPRES) 0.1 mg tablet Take  by mouth two (2) times a day.  clotrimazole (MYCELEX) 10 mg leti Take 10 mg by mouth five (5) times daily.  docusate sodium (COLACE) 100 mg capsule Take 100 mg by mouth two (2) times a day.  lidocaine 5 % topical cream Apply  to affected area two (2) times daily as needed for Itching.  LORazepam (ATIVAN) 1 mg tablet Take  by mouth every four (4) hours as needed for Anxiety.  aluminum-magnesium hydroxide 200-200 mg/5 mL susp 5 mL, diphenhydrAMINE 12.5 mg/5 mL liqd 5 mL, lidocaine 2 % soln 5 mL 5 mL by Swish and Spit route two (2) times a day. Magic mouth wash   Maalox  Lidocaine 2% viscous   Diphenhydramine oral solution     Pharmacy to mix equal portions of ingredients to a total volume as indicated in the dispense amount.  omeprazole (PRILOSEC) 20 mg capsule Take 20 mg by mouth daily.  tamsulosin (FLOMAX) 0.4 mg capsule Take 0.4 mg by mouth daily.  gabapentin (NEURONTIN) 100 mg capsule Take 1 Cap by mouth two (2) times a day. 90 Cap 1    Cane robin by Does Not Apply route.  thiamine (VITAMIN B-1) 100 mg tablet Take  by mouth daily. Allergies   Allergen Reactions    Milk Containing Products Other (comments)     No past medical history on file. No past surgical history on file. No family history on file.   Social History   Substance Use Topics    Smoking status: Never Smoker    Smokeless tobacco: Never Used    Alcohol use No     ROS     General: negative for - chills, fatigue, fever, weight change  Resp: negative for - cough, shortness of breath or wheezing  CV: negative for - chest pain, edema or palpitations  GI: negative for - abdominal pain, change in bowel habits, constipation, diarrhea or nausea/vomiting  : negative for - dysuria, hematuria, incontinence, pelvic pain or vulvar/vaginal symptoms  MSK: negative for - joint pain, joint swelling or muscle pain  Neuro: negative for - confusion, headaches, seizures or weakness    Objective:  Vitals:    04/27/18 1547   BP: 127/73   Pulse: 96   Resp: 18   Temp: 99.9 °F (37.7 °C)   TempSrc: Oral   SpO2: 98%   Weight: 112 lb 12.8 oz (51.2 kg)   Height: 5' 5\" (1.651 m)   PainSc:   0 - No pain     PE    Alert, well appearing, and in no distress, oriented to person, place, and time and thin  General appearance - alert, well appearing, and in no distress and oriented to person, place, and time  Mental status - alert, oriented to person, place, and time, normal mood, behavior, speech, dress, motor activity, and thought processes  Chest - clear to auscultation, no wheezes, rales or rhonchi, symmetric air entry  Heart - normal rate, regular rhythm, normal S1, S2, no murmurs, rubs, clicks or gallops  Musculoskeletal - no joint tenderness, deformity or swelling  Extremities - peripheral pulses normal, no pedal edema, no clubbing or cyanosis    LABS   No visits with results within 6 Month(s) from this visit.   Latest known visit with results is:    Results on 04/02/2010   Component Date Value Ref Range Status    Cholesterol, total 04/02/2010 301* 0 - 200 MG/DL Final    Triglyceride 04/02/2010 541* 0 - 150 MG/DL Final    HDL Cholesterol 04/02/2010 49  40 - 60 MG/DL Final    LDL, calculated 04/02/2010 LDL AND VLDL CHOLESTEROL NOT CALCULATED WHEN TRIGLYCERIDES >400 MG/DL OR HDL CHOLESTEROL <20 MG/DL  0 - 100 MG/DL Final    VLDL, calculated 04/02/2010 Not calculated due to elevated triglyceride level  MG/DL Final    CHOL/HDL Ratio 04/02/2010 6.1* 0 - 5.0   Final    LDL/HDL Ratio 04/02/2010 LDL AND VLDL CHOLESTEROL NOT CALCULATED WHEN TRIGLYCERIDES >400 MG/DL OR HDL CHOLESTEROL <20 MG/DL   Final    Comment:      LDL/HDL RISK FACTORS                           MEN     WOMEN        RISK FACTOR                           1.0      1.5 1/2 Average Risk                           3.6      3.2          Average Risk                           6.3      5.0      2 Times Average Risk                           8.0      6.1      3 Times Average Risk       TESTS  Results for orders placed or performed in visit on 04/02/10   LIPID PANEL   Result Value Ref Range    Cholesterol, total 301 (H) 0 - 200 MG/DL    Triglyceride 541 (H) 0 - 150 MG/DL    HDL Cholesterol 49 40 - 60 MG/DL    LDL, calculated  0 - 100 MG/DL     LDL AND VLDL CHOLESTEROL NOT CALCULATED WHEN TRIGLYCERIDES >400 MG/DL OR HDL CHOLESTEROL <20 MG/DL    VLDL, calculated Not calculated due to elevated triglyceride level MG/DL    CHOL/HDL Ratio 6.1 (H) 0 - 5.0      LDL/HDL Ratio       LDL AND VLDL CHOLESTEROL NOT CALCULATED WHEN TRIGLYCERIDES >400 MG/DL OR HDL CHOLESTEROL <20 MG/DL     Assessment/Plan:     GI bleed due to stent placement causing blood loss anemia on chronic anemia - Patient on iron. Patient also need CBC monitoring for which  He will return. We will call for results. Lab review: orders written for new lab studies as appropriate; see orders. I have discussed the diagnosis with the patient and the intended plan as seen in the above orders. The patient has received an after-visit summary and questions were answered concerning future plans. I have discussed medication side effects and warnings with the patient as well. I have reviewed the plan of care with the patient, accepted their input and they are in agreement with the treatment goals. F/U as needed. 3 months routine.      Rocco Byrnes MD

## 2018-05-01 ENCOUNTER — HOSPITAL ENCOUNTER (OUTPATIENT)
Dept: LAB | Age: 68
Discharge: HOME | End: 2018-05-01
Payer: COMMERCIAL

## 2018-05-01 DIAGNOSIS — D50.8 OTHER IRON DEFICIENCY ANEMIA: ICD-10-CM

## 2018-05-01 LAB
BASOPHILS # BLD: 0 K/UL (ref 0–0.06)
BASOPHILS NFR BLD: 0 % (ref 0–2)
DIFFERENTIAL METHOD BLD: ABNORMAL
EOSINOPHIL # BLD: 0.1 K/UL (ref 0–0.4)
EOSINOPHIL NFR BLD: 1 % (ref 0–5)
ERYTHROCYTE [DISTWIDTH] IN BLOOD BY AUTOMATED COUNT: 15.4 % (ref 11.6–14.5)
HCT VFR BLD AUTO: 36.6 % (ref 36–48)
HGB BLD-MCNC: 11.9 G/DL (ref 13–16)
LYMPHOCYTES # BLD: 1.9 K/UL (ref 0.9–3.6)
LYMPHOCYTES NFR BLD: 16 % (ref 21–52)
MCH RBC QN AUTO: 28.1 PG (ref 24–34)
MCHC RBC AUTO-ENTMCNC: 32.5 G/DL (ref 31–37)
MCV RBC AUTO: 86.5 FL (ref 74–97)
MONOCYTES # BLD: 1.1 K/UL (ref 0.05–1.2)
MONOCYTES NFR BLD: 9 % (ref 3–10)
NEUTS SEG # BLD: 8.7 K/UL (ref 1.8–8)
NEUTS SEG NFR BLD: 74 % (ref 40–73)
PLATELET # BLD AUTO: 410 K/UL (ref 135–420)
PMV BLD AUTO: 9 FL (ref 9.2–11.8)
RBC # BLD AUTO: 4.23 M/UL (ref 4.7–5.5)
WBC # BLD AUTO: 11.7 K/UL (ref 4.6–13.2)

## 2018-05-01 PROCEDURE — 36415 COLL VENOUS BLD VENIPUNCTURE: CPT | Performed by: FAMILY MEDICINE

## 2018-05-01 PROCEDURE — 85025 COMPLETE CBC W/AUTO DIFF WBC: CPT | Performed by: FAMILY MEDICINE

## 2018-05-23 NOTE — PROGRESS NOTES
Pls report mild anemia on last CBC. Plan is just to monitor for now if he is not having any SOB or fatigue. F/U in 3 months.

## 2018-05-23 NOTE — PROGRESS NOTES
Spoke to pts spouse and made aware of the message, verbalized understanding. The pts wife said that the pt is not having any SOB or fatigue.  Thank you  Annita Molina LPN

## 2018-06-14 ENCOUNTER — DOCUMENTATION ONLY (OUTPATIENT)
Dept: FAMILY MEDICINE CLINIC | Age: 68
End: 2018-06-14

## 2018-06-14 NOTE — PROGRESS NOTES
Medical Records received from office of Dr. Williemae Gottron, [address] [place] [address]. Forwarded to ORIN Gurrola MD for review and then scanning.

## 2018-06-15 ENCOUNTER — OFFICE VISIT (OUTPATIENT)
Dept: FAMILY MEDICINE CLINIC | Age: 68
End: 2018-06-15

## 2018-06-15 VITALS
HEART RATE: 91 BPM | BODY MASS INDEX: 18.49 KG/M2 | RESPIRATION RATE: 20 BRPM | TEMPERATURE: 98.4 F | OXYGEN SATURATION: 99 % | WEIGHT: 111 LBS | SYSTOLIC BLOOD PRESSURE: 106 MMHG | DIASTOLIC BLOOD PRESSURE: 66 MMHG | HEIGHT: 65 IN

## 2018-06-15 DIAGNOSIS — D64.9 ANEMIA, UNSPECIFIED TYPE: Primary | ICD-10-CM

## 2018-06-15 DIAGNOSIS — I10 ESSENTIAL HYPERTENSION: ICD-10-CM

## 2018-06-15 RX ORDER — AMLODIPINE BESYLATE 5 MG/1
5 TABLET ORAL DAILY
Qty: 90 TAB | Refills: 1 | Status: SHIPPED | OUTPATIENT
Start: 2018-06-15 | End: 2018-12-10 | Stop reason: SDUPTHER

## 2018-06-15 RX ORDER — LANCETS
EACH MISCELLANEOUS
Qty: 1 PACKAGE | Refills: 11 | Status: SHIPPED | OUTPATIENT
Start: 2018-06-15

## 2018-06-15 NOTE — PROGRESS NOTES
Sahara Skaggs is a 80 y.o. male (: 1950) presenting to address:    Chief Complaint   Patient presents with   [address] [place] [address] Follow Up       Vitals:    06/15/18 1343   BP: 106/66   Pulse: 91   Resp: 20   Temp: 98.4 °F (36.9 °C)   TempSrc: Oral   SpO2: 99%   Weight: 111 lb (50.3 kg)   Height: 5' 5\" (1.651 m)   PainSc:   0 - No pain       Hearing/Vision:   No exam data present    Learning Assessment:     Learning Assessment 2018   PRIMARY LEARNER Patient   HIGHEST LEVEL OF EDUCATION - PRIMARY LEARNER  4 YEARS OF COLLEGE   BARRIERS PRIMARY LEARNER NONE   CO-LEARNER CAREGIVER No   PRIMARY LANGUAGE OTHER (COMMENT)   LEARNER PREFERENCE PRIMARY DEMONSTRATION   ANSWERED BY patient   RELATIONSHIP SELF     Depression Screening:     PHQ over the last two weeks 6/15/2018   Little interest or pleasure in doing things Not at all   Feeling down, depressed or hopeless Not at all   Total Score PHQ 2 0     Fall Risk Assessment:     Fall Risk Assessment, last 12 mths 6/15/2018   Able to walk? Yes   Fall in past 12 months? Yes   Fall with injury? No   Number of falls in past 12 months 7   Fall Risk Score 7     Abuse Screening:     Abuse Screening Questionnaire 2018   Do you ever feel afraid of your partner? N   Are you in a relationship with someone who physically or mentally threatens you? N   Is it safe for you to go home? Y     Coordination of Care Questionaire:   1. Have you been to the ER, urgent care clinic since your last visit? Hospitalized since your last visit? YES, May 4, 2018 [facility]     2. Have you seen or consulted any other health care providers outside of the [address] [place] [address] since your last visit? Include any pap smears or colon screening.  NO

## 2018-06-16 NOTE — PROGRESS NOTES
Mendel Been is a 80 y.o. [address] male and presents with F/U for chronic pancreatitis, DM2, HTN. Chief Complaint   Patient presents with   [address] [place] [address] Follow Up       Subjective: Additional Concerns: none     Current Outpatient Prescriptions   Medication Sig Dispense Refill    lipase-protease-amylase (CREON 66646) 24,000-76,000 -120,000 unit capsule Take 1 Cap by mouth three (3) times daily (with meals). 270 Cap 1    amLODIPine (NORVASC) 5 mg tablet Take 1 Tab by mouth daily. 90 Tab 1    glucose blood VI test strips (ONETOUCH VERIO) strip Use as directed 50 Strip 3    Lancets misc For one touch verio    Use as directed 1 Package 11    HYDROcodone-acetaminophen (NORCO) 5-325 mg per tablet Take 1 Tab by mouth every six (6) hours as needed for Pain. Max Daily Amount: 4 Tabs. 60 Tab 0    hydrocortisone (PROCTOCORT) 1 % rectal cream Apply 1 Each to affected area daily as needed for Skin Irritation, Itching or Hemorrhoids. APPLY TO RECTAL AREA      potassium chloride SR (KLOR-CON 10) 10 mEq tablet Take 10 mEq by mouth daily.  simethicone (GAS RELIEF) 180 mg cap Take 180 mg by mouth as needed (gas pain).  acetaminophen (TYLENOL) 160 mg/5 mL (5 mL) suspension Take 500 mg by mouth every four (4) hours as needed for Fever, Mild Pain, Moderate Pain or Headache.  acetaminophen (TYLENOL) 325 mg tablet Take  by mouth every four (4) hours as needed for Pain.  calcium carbonate (TUMS) 200 mg calcium (500 mg) chew Take 1 Tab by mouth daily. Livan kelly by Does Not Apply route.  docusate sodium (COLACE) 100 mg capsule Take 100 mg by mouth two (2) times a day.  omeprazole (PRILOSEC) 20 mg capsule Take 20 mg by mouth daily.  tamsulosin (FLOMAX) 0.4 mg capsule Take 0.4 mg by mouth daily.  thiamine (VITAMIN B-1) 100 mg tablet Take  by mouth daily.  gabapentin (NEURONTIN) 100 mg capsule Take 1 Cap by mouth two (2) times a day.  90 Cap 1 Allergies   Allergen Reactions    Milk Containing Products Other (comments)     No past medical history on file. No past surgical history on file. No family history on file.   Social History   Substance Use Topics    Smoking status: Never Smoker    Smokeless tobacco: Never Used    Alcohol use No     ROS     General: negative for - chills, fatigue, fever, weight change  Endo: negative for - hot flashes, polydipsia/polyuria or temperature intolerance  Resp: negative for - cough, shortness of breath or wheezing  CV: negative for - chest pain, edema or palpitations  GI: negative for - abdominal pain, change in bowel habits, constipation, diarrhea or nausea/vomiting  : negative for - dysuria, hematuria, incontinence, pelvic pain or vulvar/vaginal symptoms  MSK: negative for - joint pain, joint swelling or muscle pain    Objective:  Vitals:    06/15/18 1343   BP: 106/66   Pulse: 91   Resp: 20   Temp: 98.4 °F (36.9 °C)   TempSrc: Oral   SpO2: 99%   Weight: 111 lb (50.3 kg)   Height: 5' 5\" (1.651 m)   PainSc:   0 - No pain     PE    Alert, well appearing, and in no distress, oriented to person, place, and time and underweight  General appearance - alert, well appearing, and in no distress and oriented to person, place, and time  Mental status - alert, oriented to person, place, and time, normal mood, behavior, speech, dress, motor activity, and thought processes  Chest - clear to auscultation, no wheezes, rales or rhonchi, symmetric air entry  Heart - normal rate, regular rhythm, normal S1, S2, no murmurs, rubs, clicks or gallops  Extremities - peripheral pulses normal, no pedal edema, no clubbing or cyanosis    [address] [facility] Outpatient Visit on 05/01/2018   Component Date Value Ref Range Status    WBC 05/01/2018 11.7  4.6 - 13.2 K/uL Final    RBC 05/01/2018 4.23* 4.70 - 5.50 M/uL Final    HGB 05/01/2018 11.9* 13.0 - 16.0 g/dL Final    HCT 05/01/2018 36.6  36.0 - 48.0 % Final    MCV 05/01/2018 86.5  74.0 - 97.0 FL Final    MCH 05/01/2018 28.1  24.0 - 34.0 PG Final    MCHC 05/01/2018 32.5  31.0 - 37.0 g/dL Final    RDW 05/01/2018 15.4* 11.6 - 14.5 % Final    PLATELET 99/96/7113 643  135 - 420 K/uL Final    MPV 05/01/2018 9.0* 9.2 - 11.8 FL Final    NEUTROPHILS 05/01/2018 74* 40 - 73 % Final    LYMPHOCYTES 05/01/2018 16* 21 - 52 % Final    MONOCYTES 05/01/2018 9  3 - 10 % Final    EOSINOPHILS 05/01/2018 1  0 - 5 % Final    BASOPHILS 05/01/2018 0  0 - 2 % Final    ABS. NEUTROPHILS 05/01/2018 8.7* 1.8 - 8.0 K/UL Final    ABS. LYMPHOCYTES 05/01/2018 1.9  0.9 - 3.6 K/UL Final    ABS. MONOCYTES 05/01/2018 1.1  0.05 - 1.2 K/UL Final    ABS. EOSINOPHILS 05/01/2018 0.1  0.0 - 0.4 K/UL Final    ABS. BASOPHILS 05/01/2018 0.0  0.0 - 0.06 K/UL Final    DF 05/01/2018 AUTOMATED    Final       TESTS  Results for orders placed or performed during the hospital encounter of 05/01/18   CBC WITH AUTOMATED DIFF   Result Value Ref Range    WBC 11.7 4.6 - 13.2 K/uL    RBC 4.23 (L) 4.70 - 5.50 M/uL    HGB 11.9 (L) 13.0 - 16.0 g/dL    HCT 36.6 36.0 - 48.0 %    MCV 86.5 74.0 - 97.0 FL    MCH 28.1 24.0 - 34.0 PG    MCHC 32.5 31.0 - 37.0 g/dL    RDW 15.4 (H) 11.6 - 14.5 %    PLATELET 964 456 - 333 K/uL    MPV 9.0 (L) 9.2 - 11.8 FL    NEUTROPHILS 74 (H) 40 - 73 %    LYMPHOCYTES 16 (L) 21 - 52 %    MONOCYTES 9 3 - 10 %    EOSINOPHILS 1 0 - 5 %    BASOPHILS 0 0 - 2 %    ABS. NEUTROPHILS 8.7 (H) 1.8 - 8.0 K/UL    ABS. LYMPHOCYTES 1.9 0.9 - 3.6 K/UL    ABS. MONOCYTES 1.1 0.05 - 1.2 K/UL    ABS. EOSINOPHILS 0.1 0.0 - 0.4 K/UL    ABS. BASOPHILS 0.0 0.0 - 0.06 K/UL    DF AUTOMATED         Assessment/Plan:      1. Essential hypertension controlled - Refilled Norvasc x 6 months. 2. Chronic pancreatitis - He now has a peritoneal drain. Creon refilled. Patient following with GI with additional work up. His pain is significantly   Diminished since and jusr using opioids infrequently.      3. DM2 - refilled strips and lancets for blood sugar monitoring. Lab review: orders written for new lab studies as appropriate; see orders    I have discussed the diagnosis with the patient and the intended plan as seen in the above orders. The patient has received an after-visit summary and questions were answered concerning future plans. I have discussed medication side effects and warnings with the patient as well. I have reviewed the plan of care with the patient, accepted their input and they are in agreement with the treatment goals.      F/U as needed    Traci Tierney MD

## 2018-06-16 NOTE — PATIENT INSTRUCTIONS
DASH Diet: Care Instructions  Your Care Instructions    The DASH diet is an eating plan that can help lower your blood pressure. DASH stands for Dietary Approaches to Stop Hypertension. Hypertension is high blood pressure. The DASH diet focuses on eating foods that are high in calcium, potassium, and magnesium. These nutrients can lower blood pressure. The foods that are highest in these nutrients are fruits, vegetables, low-fat dairy products, nuts, seeds, and legumes. But taking calcium, potassium, and magnesium supplements instead of eating foods that are high in those nutrients does not have the same effect. The DASH diet also includes whole grains, fish, and poultry. The DASH diet is one of several lifestyle changes your doctor may recommend to lower your high blood pressure. Your doctor may also want you to decrease the amount of sodium in your diet. Lowering sodium while following the DASH diet can lower blood pressure even further than just the DASH diet alone. Follow-up care is a key part of your treatment and safety. Be sure to make and go to all appointments, and call your doctor if you are having problems. It's also a good idea to know your test results and keep a list of the medicines you take. How can you care for yourself at home? Following the DASH diet  · Eat 4 to 5 servings of fruit each day. A serving is 1 medium-sized piece of fruit, ½ cup chopped or canned fruit, 1/4 cup dried fruit, or 4 ounces (½ cup) of fruit juice. Choose fruit more often than fruit juice. · Eat 4 to 5 servings of vegetables each day. A serving is 1 cup of lettuce or raw leafy vegetables, ½ cup of chopped or cooked vegetables, or 4 ounces (½ cup) of vegetable juice. Choose vegetables more often than vegetable juice. · Get 2 to 3 servings of low-fat and fat-free dairy each day. A serving is 8 ounces of milk, 1 cup of yogurt, or 1 ½ ounces of cheese. · Eat 6 to 8 servings of grains each day.  A serving is 1 slice of bread, 1 ounce of dry cereal, or ½ cup of cooked rice, pasta, or cooked cereal. Try to choose whole-grain products as much as possible. · Limit lean meat, poultry, and fish to 2 servings each day. A serving is 3 ounces, about the size of a deck of cards. · Eat 4 to 5 servings of nuts, seeds, and legumes (cooked dried beans, lentils, and split peas) each week. A serving is 1/3 cup of nuts, 2 tablespoons of seeds, or ½ cup of cooked beans or peas. · Limit fats and oils to 2 to 3 servings each day. A serving is 1 teaspoon of vegetable oil or 2 tablespoons of salad dressing. · Limit sweets and added sugars to 5 servings or less a week. A serving is 1 tablespoon jelly or jam, ½ cup sorbet, or 1 cup of lemonade. · Eat less than 2,300 milligrams (mg) of sodium a day. If you limit your sodium to 1,500 mg a day, you can lower your blood pressure even more. Tips for success  · Start small. Do not try to make dramatic changes to your diet all at once. You might feel that you are missing out on your favorite foods and then be more likely to not follow the plan. Make small changes, and stick with them. Once those changes become habit, add a few more changes. · Try some of the following:  ¨ Make it a goal to eat a fruit or vegetable at every meal and at snacks. This will make it easy to get the recommended amount of fruits and vegetables each day. ¨ Try yogurt topped with fruit and nuts for a snack or healthy dessert. ¨ Add lettuce, tomato, cucumber, and onion to sandwiches. ¨ Combine a ready-made pizza crust with low-fat mozzarella cheese and lots of vegetable toppings. Try using tomatoes, squash, spinach, broccoli, carrots, cauliflower, and onions. ¨ Have a variety of cut-up vegetables with a low-fat dip as an appetizer instead of chips and dip. ¨ Sprinkle sunflower seeds or chopped almonds over salads. Or try adding chopped walnuts or almonds to cooked vegetables.   ¨ Try some vegetarian meals using beans and peas. Add garbanzo or kidney beans to salads. Make burritos and tacos with mashed culver beans or black beans. Where can you learn more? Go to http://davina-rekha.info/. Enter E963 in the search box to learn more about \"DASH Diet: Care Instructions. \"  Current as of: September 21, 2016  Content Version: 11.4  © 3659-7709 [employer]. Care instructions adapted under license by [employer] (which disclaims liability or warranty for this information). If you have questions about a medical condition or this instruction, always ask your healthcare professional. [address] any warranty or liability for your use of this information. Low Sodium Diet (2,000 Milligram): Care Instructions  Your Care Instructions    Too much sodium causes your body to hold on to extra water. This can raise your blood pressure and force your heart and kidneys to work harder. In very serious cases, this could cause you to be put in the hospital. It might even be life-threatening. By limiting sodium, you will feel better and lower your risk of serious problems. The most common source of sodium is salt. People get most of the salt in their diet from canned, prepared, and packaged foods. Fast food and restaurant meals also are very high in sodium. Your doctor will probably limit your sodium to less than 2,000 milligrams (mg) a day. This limit counts all the sodium in prepared and packaged foods and any salt you add to your food. Follow-up care is a key part of your treatment and safety. Be sure to make and go to all appointments, and call your doctor if you are having problems. It's also a good idea to know your test results and keep a list of the medicines you take. How can you care for yourself at home? Read food labels  · Read labels on cans and food packages. The labels tell you how much sodium is in each serving. Make sure that you look at the serving size.  If you eat more than the serving size, you have eaten more sodium. · Food labels also tell you the Percent Daily Value for sodium. Choose products with low Percent Daily Values for sodium. · Be aware that sodium can come in forms other than salt, including monosodium glutamate (MSG), sodium citrate, and sodium bicarbonate (baking soda). MSG is often added to Asian food. When you eat out, you can sometimes ask for food without MSG or added salt. Buy low-sodium foods  · Buy foods that are labeled \"unsalted\" (no salt added), \"sodium-free\" (less than 5 mg of sodium per serving), or \"low-sodium\" (less than 140 mg of sodium per serving). Foods labeled \"reduced-sodium\" and \"light sodium\" may still have too much sodium. Be sure to read the label to see how much sodium you are getting. · Buy fresh vegetables, or frozen vegetables without added sauces. Buy low-sodium versions of canned vegetables, soups, and other canned goods. Prepare low-sodium meals  · Cut back on the amount of salt you use in cooking. This will help you adjust to the taste. Do not add salt after cooking. One teaspoon of salt has about 2,300 mg of sodium. · Take the salt shaker off the table. · Flavor your food with garlic, lemon juice, onion, vinegar, herbs, and spices. Do not use soy sauce, lite soy sauce, steak sauce, onion salt, garlic salt, celery salt, mustard, or ketchup on your food. · Use low-sodium salad dressings, sauces, and ketchup. Or make your own salad dressings and sauces without adding salt. · Use less salt (or none) when recipes call for it. You can often use half the salt a recipe calls for without losing flavor. Other foods such as rice, pasta, and grains do not need added salt. · Rinse canned vegetables, and cook them in fresh water. This removes some-but not all-of the salt. · Avoid water that is naturally high in sodium or that has been treated with water softeners, which add sodium.  Call your local water company to find out the sodium content of your water supply. If you buy bottled water, read the label and choose a sodium-free brand. Avoid high-sodium foods  · Avoid eating:  ¨ Smoked, cured, salted, and canned meat, fish, and poultry. ¨ Ham, vang, hot dogs, and luncheon meats. ¨ Regular, hard, and processed cheese and regular peanut butter. ¨ Crackers with salted tops, and other salted snack foods such as pretzels, chips, and salted popcorn. ¨ Frozen prepared meals, unless labeled low-sodium. ¨ Canned and dried soups, broths, and bouillon, unless labeled sodium-free or low-sodium. ¨ Canned vegetables, unless labeled sodium-free or low-sodium. ¨ Western Sheila fries, pizza, tacos, and other fast foods. ¨ Pickles, olives, ketchup, and other condiments, especially soy sauce, unless labeled sodium-free or low-sodium. Where can you learn more? Go to http://davina-rekha.info/. Enter S944 in the search box to learn more about \"Low Sodium Diet (2,000 Milligram): Care Instructions. \"  Current as of: May 12, 2017  Content Version: 11.4  © 2565-5657 [employer]. Care instructions adapted under license by [employer] (which disclaims liability or warranty for this information). If you have questions about a medical condition or this instruction, always ask your healthcare professional. [place] [address] any warranty or liability for your use of this information. Learning About Diabetes Food Guidelines  Your Care Instructions    Meal planning is important to manage diabetes. It helps keep your blood sugar at a target level (which you set with your doctor). You don't have to eat special foods. You can eat what your family eats, including sweets once in a while. But you do have to pay attention to how often you eat and how much you eat of certain foods. You may want to work with a dietitian or a certified diabetes educator (CDE) to help you plan meals and snacks.  A dietitian or CDE can also help you lose weight if that is one of your goals. What should you know about eating carbs? Managing the amount of carbohydrate (carbs) you eat is an important part of healthy meals when you have diabetes. Carbohydrate is found in many foods. · Learn which foods have carbs. And learn the amounts of carbs in different foods. ¨ Bread, cereal, pasta, and rice have about 15 grams of carbs in a serving. A serving is 1 slice of bread (1 ounce), ½ cup of cooked cereal, or 1/3 cup of cooked pasta or rice. ¨ Fruits have 15 grams of carbs in a serving. A serving is 1 small fresh fruit, such as an apple or orange; ½ of a banana; ½ cup of cooked or canned fruit; ½ cup of fruit juice; 1 cup of melon or raspberries; or 2 tablespoons of dried fruit. ¨ Milk and no-sugar-added yogurt have 15 grams of carbs in a serving. A serving is 1 cup of milk or 2/3 cup of no-sugar-added yogurt. ¨ Starchy vegetables have 15 grams of carbs in a serving. A serving is ½ cup of mashed potatoes or sweet potato; 1 cup winter squash; ½ of a small baked potato; ½ cup of cooked beans; or ½ cup cooked corn or green peas. · Learn how much carbs to eat each day and at each meal. A dietitian or CDE can teach you how to keep track of the amount of carbs you eat. This is called carbohydrate counting. · If you are not sure how to count carbohydrate grams, use the Plate Method to plan meals. It is a good, quick way to make sure that you have a balanced meal. It also helps you spread carbs throughout the day. ¨ Divide your plate by types of foods. Put non-starchy vegetables on half the plate, meat or other protein food on one-quarter of the plate, and a grain or starchy vegetable in the final quarter of the plate.  To this you can add a small piece of fruit and 1 cup of milk or yogurt, depending on how many carbs you are supposed to eat at a meal.  · Try to eat about the same amount of carbs at each meal. Do not \"save up\" your daily allowance of carbs to eat at one meal.  · Proteins have very little or no carbs per serving. Examples of proteins are beef, chicken, turkey, fish, eggs, tofu, cheese, cottage cheese, and peanut butter. A serving size of meat is 3 ounces, which is about the size of a deck of cards. Examples of meat substitute serving sizes (equal to 1 ounce of meat) are 1/4 cup of cottage cheese, 1 egg, 1 tablespoon of peanut butter, and ½ cup of tofu. How can you eat out and still eat healthy? · Learn to estimate the serving sizes of foods that have carbohydrate. If you measure food at home, it will be easier to estimate the amount in a serving of restaurant food. · If the meal you order has too much carbohydrate (such as potatoes, corn, or baked beans), ask to have a low-carbohydrate food instead. Ask for a salad or green vegetables. · If you use insulin, check your blood sugar before and after eating out to help you plan how much to eat in the future. · If you eat more carbohydrate at a meal than you had planned, take a walk or do other exercise. This will help lower your blood sugar. What else should you know? · Limit saturated fat, such as the fat from meat and dairy products. This is a healthy choice because people who have diabetes are at higher risk of heart disease. So choose lean cuts of meat and nonfat or low-fat dairy products. Use olive or canola oil instead of butter or shortening when cooking. · Don't skip meals. Your blood sugar may drop too low if you skip meals and take insulin or certain medicines for diabetes. · Check with your doctor before you drink alcohol. Alcohol can cause your blood sugar to drop too low. Alcohol can also cause a bad reaction if you take certain diabetes medicines. Follow-up care is a key part of your treatment and safety. Be sure to make and go to all appointments, and call your doctor if you are having problems.  It's also a good idea to know your test results and keep a list of the medicines you take. Where can you learn more? Go to http://davina-rekha.info/. Enter K269 in the search box to learn more about \"Learning About Diabetes Food Guidelines. \"  Current as of: March 13, 2017  Content Version: 11.4  © 7860-1584 [employer]. Care instructions adapted under license by "[employer] (which disclaims liability or warranty for this information). If you have questions about a medical condition or this instruction, always ask your healthcare professional. [address] any warranty or liability for your use of this information. Diet for Chronic Pancreatitis: Care Instructions  Your Care Instructions    The pancreas is an organ behind the stomach that makes hormones and enzymes to help your body digest food. Sometimes the enzymes attack another part of the pancreas, which can cause pain and swelling. This is called pancreatitis. Chronic pancreatitis may cause you to be in pain much of the time. You may be able to help the pain by avoiding alcohol and eating a low-fat diet. Your doctor and dietitian can help you make an eating plan that does not irritate your digestive system. Always talk with your doctor or dietitian before you make changes in your diet. Follow-up care is a key part of your treatment and safety. Be sure to make and go to all appointments, and call your doctor if you are having problems. It's also a good idea to know your test results and keep a list of the medicines you take. How can you care for yourself at home? · Do not drink alcohol. It may make your pain worse and cause other problems. Tell your doctor if you need help to quit. Counseling, support groups, and sometimes medicines can help you stay sober. · Ask your doctor if you need to take pancreatic enzyme pills to help your body digest fat and protein. · Drink plenty of fluids, enough so that your urine is light yellow or clear like water.  If you have kidney, heart, or liver disease and have to limit fluids, talk with your doctor before you increase the amount of fluids you drink. Eat a low-fat diet  · Eat many small meals and snacks each day instead of three large meals. · Choose lean meats. ¨ Eat no more than 5 to 6½ ounces of meat a day. ¨ Cut off all fat you can see. ¨ Eat chicken and turkey without the skin. ¨ Many types of fish, such as salmon, lake trout, tuna, and herring, provide healthy omega-3 fat. But avoid fish canned in oil, such as sardines in olive oil. ¨ Bake, broil, or grill meats, poultry, or fish instead of frying them in butter or fat. · Drink or eat nonfat or low-fat milk, yogurt, cheese, or other milk products each day. ¨ Read the labels on cheeses, and choose those with less than 5 grams of fat an ounce. ¨ Try fat-free sour cream, cream cheese, or yogurt. ¨ Avoid cream soups and cream sauces on pasta. ¨ Eat low-fat ice cream, frozen yogurt, or sorbet. Avoid regular ice cream.  · Eat whole-grain cereals, breads, crackers, rice, or pasta. Avoid high-fat foods such as croissants, scones, biscuits, waffles, doughnuts, muffins, granola, and high-fat breads. · Flavor your foods with herbs and spices (such as basil, tarragon, or mint), fat-free sauces, or lemon juice instead of butter. You can also use butter substitutes, fat-free mayonnaise, or fat-free dressing. · Try applesauce, prune puree, or mashed bananas to replace some or all of the fat when you bake. · Limit fats and oils, such as butter, margarine, mayonnaise, and salad dressing, to no more than 1 tablespoon a meal.  · Avoid high-fat foods, such as:  ¨ Chocolate, whole milk, ice cream, processed cheese, and egg yolks. ¨ Fried or buttered foods. ¨ Sausage, salami, and vang. ¨ Cinnamon rolls, cakes, pies, cookies, and other pastries. ¨ Prepared snack foods, such as potato chips, nut and granola bars, and mixed nuts. ¨ Coconut and avocado.   · Learn how to read food labels for serving sizes and ingredients. Fast-food and convenience-food meals often have lots of fat. Where can you learn more? Go to http://davina-rekha.info/. Enter T482 in the search box to learn more about \"Diet for Chronic Pancreatitis: Care Instructions. \"  Current as of: May 12, 2017  Content Version: 11.4  © 0555-1902 [employer]. Care instructions adapted under license by [employer] (which disclaims liability or warranty for this information). If you have questions about a medical condition or this instruction, always ask your healthcare professional. [place] [address] any warranty or liability for your use of this information.

## 2018-07-18 ENCOUNTER — HOSPITAL ENCOUNTER (OUTPATIENT)
Dept: LAB | Age: 68
Discharge: HOME | End: 2018-07-18
Payer: COMMERCIAL

## 2018-07-18 ENCOUNTER — TELEPHONE (OUTPATIENT)
Dept: FAMILY MEDICINE CLINIC | Age: 68
End: 2018-07-18

## 2018-07-18 ENCOUNTER — OFFICE VISIT (OUTPATIENT)
Dept: FAMILY MEDICINE CLINIC | Age: 68
End: 2018-07-18

## 2018-07-18 VITALS
RESPIRATION RATE: 18 BRPM | TEMPERATURE: 97.1 F | HEART RATE: 76 BPM | WEIGHT: 118 LBS | DIASTOLIC BLOOD PRESSURE: 63 MMHG | BODY MASS INDEX: 19.66 KG/M2 | SYSTOLIC BLOOD PRESSURE: 106 MMHG | OXYGEN SATURATION: 98 % | HEIGHT: 65 IN

## 2018-07-18 DIAGNOSIS — K86.0 ALCOHOL-INDUCED CHRONIC PANCREATITIS (HCC): Primary | ICD-10-CM

## 2018-07-18 DIAGNOSIS — K86.0 ALCOHOL-INDUCED CHRONIC PANCREATITIS (HCC): ICD-10-CM

## 2018-07-18 DIAGNOSIS — R60.0 BILATERAL LEG EDEMA: ICD-10-CM

## 2018-07-18 LAB
ALBUMIN SERPL-MCNC: 4.1 G/DL (ref 3.4–5)
ALBUMIN/GLOB SERPL: 1.2 {RATIO} (ref 0.8–1.7)
ALP SERPL-CCNC: 70 U/L (ref 45–117)
ALT SERPL-CCNC: 26 U/L (ref 16–61)
ANION GAP SERPL CALC-SCNC: 8 MMOL/L (ref 3–18)
AST SERPL-CCNC: 22 U/L (ref 15–37)
BASOPHILS # BLD: 0 K/UL (ref 0–0.1)
BASOPHILS NFR BLD: 0 % (ref 0–2)
BILIRUB SERPL-MCNC: 0.3 MG/DL (ref 0.2–1)
BUN SERPL-MCNC: 20 MG/DL (ref 7–18)
BUN/CREAT SERPL: 23 (ref 12–20)
CALCIUM SERPL-MCNC: 8.4 MG/DL (ref 8.5–10.1)
CHLORIDE SERPL-SCNC: 106 MMOL/L (ref 100–108)
CO2 SERPL-SCNC: 29 MMOL/L (ref 21–32)
CREAT SERPL-MCNC: 0.86 MG/DL (ref 0.6–1.3)
DIFFERENTIAL METHOD BLD: ABNORMAL
EOSINOPHIL # BLD: 0.2 K/UL (ref 0–0.4)
EOSINOPHIL NFR BLD: 2 % (ref 0–5)
ERYTHROCYTE [DISTWIDTH] IN BLOOD BY AUTOMATED COUNT: 15.4 % (ref 11.6–14.5)
GLOBULIN SER CALC-MCNC: 3.5 G/DL (ref 2–4)
GLUCOSE SERPL-MCNC: 157 MG/DL (ref 74–99)
HCT VFR BLD AUTO: 35.3 % (ref 36–48)
HGB BLD-MCNC: 11.7 G/DL (ref 13–16)
LIPASE SERPL-CCNC: 116 U/L (ref 73–393)
LYMPHOCYTES # BLD: 4.1 K/UL (ref 0.9–3.6)
LYMPHOCYTES NFR BLD: 44 % (ref 21–52)
MCH RBC QN AUTO: 30 PG (ref 24–34)
MCHC RBC AUTO-ENTMCNC: 33.1 G/DL (ref 31–37)
MCV RBC AUTO: 90.5 FL (ref 74–97)
MONOCYTES # BLD: 0.7 K/UL (ref 0.05–1.2)
MONOCYTES NFR BLD: 7 % (ref 3–10)
NEUTS SEG # BLD: 4.4 K/UL (ref 1.8–8)
NEUTS SEG NFR BLD: 47 % (ref 40–73)
PLATELET # BLD AUTO: 243 K/UL (ref 135–420)
PMV BLD AUTO: 10 FL (ref 9.2–11.8)
POTASSIUM SERPL-SCNC: 3.8 MMOL/L (ref 3.5–5.5)
PROT SERPL-MCNC: 7.6 G/DL (ref 6.4–8.2)
RBC # BLD AUTO: 3.9 M/UL (ref 4.7–5.5)
SODIUM SERPL-SCNC: 143 MMOL/L (ref 136–145)
WBC # BLD AUTO: 9.4 K/UL (ref 4.6–13.2)

## 2018-07-18 PROCEDURE — 80053 COMPREHEN METABOLIC PANEL: CPT | Performed by: FAMILY MEDICINE

## 2018-07-18 PROCEDURE — 85025 COMPLETE CBC W/AUTO DIFF WBC: CPT | Performed by: FAMILY MEDICINE

## 2018-07-18 PROCEDURE — 36415 COLL VENOUS BLD VENIPUNCTURE: CPT | Performed by: FAMILY MEDICINE

## 2018-07-18 PROCEDURE — 83690 ASSAY OF LIPASE: CPT | Performed by: FAMILY MEDICINE

## 2018-07-18 RX ORDER — FUROSEMIDE 40 MG/1
TABLET ORAL
Qty: 5 TAB | Refills: 2 | Status: SHIPPED | OUTPATIENT
Start: 2018-07-18 | End: 2018-07-18 | Stop reason: CLARIF

## 2018-07-18 NOTE — PATIENT INSTRUCTIONS
Leg and Ankle Edema: Care Instructions  Your Care Instructions  Swelling in the legs, ankles, and feet is called edema. It is common after you sit or stand for a while. Long plane flights or car rides often cause swelling in the legs and feet. You may also have swelling if you have to stand for long periods of time at your job. Problems with the veins in the legs (varicose veins) and changes in hormones can also cause swelling. Sometimes the swelling in the ankles and feet is caused by a more serious problem, such as heart failure, infection, blood clots, or liver or kidney disease. Follow-up care is a key part of your treatment and safety. Be sure to make and go to all appointments, and call your doctor if you are having problems. It's also a good idea to know your test results and keep a list of the medicines you take. How can you care for yourself at home? · If your doctor gave you medicine, take it as prescribed. Call your doctor if you think you are having a problem with your medicine. · Whenever you are resting, raise your legs up. Try to keep the swollen area higher than the level of your heart. · Take breaks from standing or sitting in one position. ¨ Walk around to increase the blood flow in your lower legs. ¨ Move your feet and ankles often while you stand, or tighten and relax your leg muscles. · Wear support stockings. Put them on in the morning, before swelling gets worse. · Eat a balanced diet. Lose weight if you need to. · Limit the amount of salt (sodium) in your diet. Salt holds fluid in the body and may increase swelling. When should you call for help? Call 911 anytime you think you may need emergency care. For example, call if:    · You have symptoms of a blood clot in your lung (called a pulmonary embolism). These may include:  ¨ Sudden chest pain. ¨ Trouble breathing.   ¨ Coughing up blood.    Call your doctor now or seek immediate medical care if:    · You have signs of a blood clot, such as:  ¨ Pain in your calf, back of the knee, thigh, or groin. ¨ Redness and swelling in your leg or groin.     · You have symptoms of infection, such as:  ¨ Increased pain, swelling, warmth, or redness. ¨ Red streaks or pus. ¨ A fever.    Watch closely for changes in your health, and be sure to contact your doctor if:    · Your swelling is getting worse.     · You have new or worsening pain in your legs.     · You do not get better as expected. Where can you learn more? Go to http://davina-rekha.info/. Enter E547 in the search box to learn more about \"Leg and Ankle Edema: Care Instructions. \"  Current as of: November 20, 2017  Content Version: 11.7  © 7680-5433 [employer]. Care instructions adapted under license by [employer] (which disclaims liability or warranty for this information). If you have questions about a medical condition or this instruction, always ask your healthcare professional. [place] [address] any warranty or liability for your use of this information.

## 2018-07-18 NOTE — TELEPHONE ENCOUNTER
Pt called reporting swelling on his LE for a week now. Pt is scheduled for an appt for today at 4pm. Denied chest pain/ SOB/ he said just swelling.

## 2018-07-18 NOTE — PROGRESS NOTES
Gabbie Costa is a 80 y.o. male presents to office for mri results and leg swelling for about a week    Medication list has been reviewed with Gabbie Costa and updated as of today's date     Health Maintenance items with a due date reviewed with patient:  Health Maintenance Due   Topic Date Due    Hepatitis C Screening  1950    DTaP/Tdap/Td series (1 - Tdap) 01/01/1972    FOBT Q 1 YEAR AGE 50-75  01/01/2001    ZOSTER VACCINE AGE 60>  11/01/2010    GLAUCOMA SCREENING Q2Y  01/01/2016    Pneumococcal 65+ Low/Medium Risk (1 of 2 - PCV13) 01/01/2016

## 2018-07-19 ENCOUNTER — TELEPHONE (OUTPATIENT)
Dept: FAMILY MEDICINE CLINIC | Age: 68
End: 2018-07-19

## 2018-07-19 NOTE — PROGRESS NOTES
Kylah Goodwin is a 80 y.o. [address] [facility] male and presents with f/U for apparent bilateral ankle and feet edema. Subjective: Additional Concerns: none     Current Outpatient Prescriptions   Medication Sig Dispense Refill    lipase-protease-amylase (CREON 59627) 24,000-76,000 -120,000 unit capsule Take 1 Cap by mouth three (3) times daily (with meals). 270 Cap 1    amLODIPine (NORVASC) 5 mg tablet Take 1 Tab by mouth daily. 90 Tab 1    glucose blood VI test strips (ONETOUCH VERIO) strip Use as directed 50 Strip 3    Lancets misc For one touch verio    Use as directed 1 Package 11    acetaminophen (TYLENOL) 325 mg tablet Take  by mouth every four (4) hours as needed for Pain.  calcium carbonate (TUMS) 200 mg calcium (500 mg) chew Take 1 Tab by mouth daily. Selina kelly by Does Not Apply route.  HYDROcodone-acetaminophen (NORCO) 5-325 mg per tablet Take 1 Tab by mouth every six (6) hours as needed for Pain. Max Daily Amount: 4 Tabs. 60 Tab 0    hydrocortisone (PROCTOCORT) 1 % rectal cream Apply 1 Each to affected area daily as needed for Skin Irritation, Itching or Hemorrhoids. APPLY TO RECTAL AREA      potassium chloride SR (KLOR-CON 10) 10 mEq tablet Take 10 mEq by mouth daily.  simethicone (GAS RELIEF) 180 mg cap Take 180 mg by mouth as needed (gas pain).  acetaminophen (TYLENOL) 160 mg/5 mL (5 mL) suspension Take 500 mg by mouth every four (4) hours as needed for Fever, Mild Pain, Moderate Pain or Headache.  docusate sodium (COLACE) 100 mg capsule Take 100 mg by mouth two (2) times a day.  omeprazole (PRILOSEC) 20 mg capsule Take 20 mg by mouth daily.  tamsulosin (FLOMAX) 0.4 mg capsule Take 0.4 mg by mouth daily.  thiamine (VITAMIN B-1) 100 mg tablet Take  by mouth daily.  gabapentin (NEURONTIN) 100 mg capsule Take 1 Cap by mouth two (2) times a day.  90 Cap 1     Allergies   Allergen Reactions    Milk Containing Products Other (comments)     History reviewed. No pertinent past medical history. History reviewed. No pertinent surgical history. History reviewed. No pertinent family history.   Social History   Substance Use Topics    Smoking status: Never Smoker    Smokeless tobacco: Never Used    Alcohol use No     ROS     General: negative for - chills, fatigue, fever, weight change  Psych: negative for - anxiety, depression, irritability or mood swings  Resp: negative for - cough, shortness of breath or wheezing  CV: negative for - chest pain, edema or palpitations  GI: negative for - abdominal pain, change in bowel habits, constipation, diarrhea or nausea/vomiting  MSK: negative for - joint pain, positive ankle joint swelling, no muscle pain  Neuro: negative for - confusion, headaches, seizures or weakness    Objective:  Vitals:    07/18/18 1617   BP: 106/63   Pulse: 76   Resp: 18   Temp: 97.1 °F (36.2 °C)   TempSrc: Oral   SpO2: 98%   Weight: 118 lb (53.5 kg)   Height: 5' 5\" (1.651 m)   PainSc:   0 - No pain     PE    alert, well appearing, and in no distress, oriented to person, place, and time and normal appearing weight  General appearance - alert, well appearing, and in no distress, oriented to person, place, and time and normal appearing weight  Mental status - alert, oriented to person, place, and time, normal mood, behavior, speech, dress, motor activity, and thought processes  Chest - clear to auscultation, no wheezes, rales or rhonchi, symmetric air entry  Heart - normal rate, regular rhythm, normal S1, S2, no murmurs, rubs, clicks or gallops  Musculoskeletal - no joint tenderness, deformity or swelling  Extremities - peripheral pulses normal, no pedal edema, no clubbing or cyanosis    [facility] on [facility]   Component Date Value Ref Range Status    WBC 07/18/2018 9.4  4.6 - 13.2 K/uL Final    RBC 07/18/2018 3.90* 4.70 - 5.50 M/uL Final    HGB 07/18/2018 11.7* 13.0 - 16.0 g/dL Final    HCT 07/18/2018 35.3* 36.0 - 48.0 % Final    MCV 07/18/2018 90.5  74.0 - 97.0 FL Final    MCH 07/18/2018 30.0  24.0 - 34.0 PG Final    MCHC 07/18/2018 33.1  31.0 - 37.0 g/dL Final    RDW 07/18/2018 15.4* 11.6 - 14.5 % Final    PLATELET 02/86/7888 301  135 - 420 K/uL Final    MPV 07/18/2018 10.0  9.2 - 11.8 FL Final    NEUTROPHILS 07/18/2018 47  40 - 73 % Final    LYMPHOCYTES 07/18/2018 44  21 - 52 % Final    MONOCYTES 07/18/2018 7  3 - 10 % Final    EOSINOPHILS 07/18/2018 2  0 - 5 % Final    BASOPHILS 07/18/2018 0  0 - 2 % Final    ABS. NEUTROPHILS 07/18/2018 4.4  1.8 - 8.0 K/UL Final    ABS. LYMPHOCYTES 07/18/2018 4.1* 0.9 - 3.6 K/UL Final    ABS. MONOCYTES 07/18/2018 0.7  0.05 - 1.2 K/UL Final    ABS. EOSINOPHILS 07/18/2018 0.2  0.0 - 0.4 K/UL Final    ABS. BASOPHILS 07/18/2018 0.0  0.0 - 0.1 K/UL Final    DF 07/18/2018 AUTOMATED    Final    Sodium 07/18/2018 143  136 - 145 mmol/L Final    Potassium 07/18/2018 3.8  3.5 - 5.5 mmol/L Final    Chloride 07/18/2018 106  100 - 108 mmol/L Final    CO2 07/18/2018 29  21 - 32 mmol/L Final    Anion gap 07/18/2018 8  3.0 - 18 mmol/L Final    Glucose 07/18/2018 157* 74 - 99 mg/dL Final    BUN 07/18/2018 20* 7.0 - 18 MG/DL Final    Creatinine 07/18/2018 0.86  0.6 - 1.3 MG/DL Final    BUN/Creatinine ratio 07/18/2018 23* 12 - 20   Final    GFR est AA 07/18/2018 >60  >60 ml/min/1.73m2 Final    GFR est non-AA 07/18/2018 >60  >60 ml/min/1.73m2 Final    Comment: (NOTE)  Estimated GFR is calculated using the Modification of Diet in Renal   Disease (MDRD) Study equation, reported for both  Americans   (GFRAA) and non- Americans (GFRNA), and normalized to 1.73m2   body surface area. The physician must decide which value applies to   the patient. The MDRD study equation should only be used in   individuals age 25 or older.  It has not been validated for the   following: pregnant women, patients with serious comorbid conditions,   or on certain medications, or persons with extremes of body size,   muscle mass, or nutritional status.  Calcium 07/18/2018 8.4* 8.5 - 10.1 MG/DL Final    Bilirubin, total 07/18/2018 0.3  0.2 - 1.0 MG/DL Final    ALT (SGPT) 07/18/2018 26  16 - 61 U/L Final    AST (SGOT) 07/18/2018 22  15 - 37 U/L Final    Alk. phosphatase 07/18/2018 70  45 - 117 U/L Final    Protein, total 07/18/2018 7.6  6.4 - 8.2 g/dL Final    Albumin 07/18/2018 4.1  3.4 - 5.0 g/dL Final    Globulin 07/18/2018 3.5  2.0 - 4.0 g/dL Final    A-G Ratio 07/18/2018 1.2  0.8 - 1.7   Final    Lipase 07/18/2018 116  73 - 393 U/L Final   Hospital Outpatient Visit on 05/01/2018   Component Date Value Ref Range Status    WBC 05/01/2018 11.7  4.6 - 13.2 K/uL Final    RBC 05/01/2018 4.23* 4.70 - 5.50 M/uL Final    HGB 05/01/2018 11.9* 13.0 - 16.0 g/dL Final    HCT 05/01/2018 36.6  36.0 - 48.0 % Final    MCV 05/01/2018 86.5  74.0 - 97.0 FL Final    MCH 05/01/2018 28.1  24.0 - 34.0 PG Final    MCHC 05/01/2018 32.5  31.0 - 37.0 g/dL Final    RDW 05/01/2018 15.4* 11.6 - 14.5 % Final    PLATELET 51/89/5654 362  135 - 420 K/uL Final    MPV 05/01/2018 9.0* 9.2 - 11.8 FL Final    NEUTROPHILS 05/01/2018 74* 40 - 73 % Final    LYMPHOCYTES 05/01/2018 16* 21 - 52 % Final    MONOCYTES 05/01/2018 9  3 - 10 % Final    EOSINOPHILS 05/01/2018 1  0 - 5 % Final    BASOPHILS 05/01/2018 0  0 - 2 % Final    ABS. NEUTROPHILS 05/01/2018 8.7* 1.8 - 8.0 K/UL Final    ABS. LYMPHOCYTES 05/01/2018 1.9  0.9 - 3.6 K/UL Final    ABS. MONOCYTES 05/01/2018 1.1  0.05 - 1.2 K/UL Final    ABS. EOSINOPHILS 05/01/2018 0.1  0.0 - 0.4 K/UL Final    ABS.  BASOPHILS 05/01/2018 0.0  0.0 - 0.06 K/UL Final    DF 05/01/2018 AUTOMATED    Final       TESTS  Results for orders placed or performed during the hospital encounter of 05/01/18   CBC WITH AUTOMATED DIFF   Result Value Ref Range    WBC 11.7 4.6 - 13.2 K/uL    RBC 4.23 (L) 4.70 - 5.50 M/uL    HGB 11.9 (L) 13.0 - 16.0 g/dL    HCT 36.6 36.0 - 48.0 %    MCV 86.5 74.0 - 97.0 FL    MCH 28.1 24.0 - 34.0 PG    MCHC 32.5 31.0 - 37.0 g/dL    RDW 15.4 (H) 11.6 - 14.5 %    PLATELET 968 347 - 680 K/uL    MPV 9.0 (L) 9.2 - 11.8 FL    NEUTROPHILS 74 (H) 40 - 73 %    LYMPHOCYTES 16 (L) 21 - 52 %    MONOCYTES 9 3 - 10 %    EOSINOPHILS 1 0 - 5 %    BASOPHILS 0 0 - 2 %    ABS. NEUTROPHILS 8.7 (H) 1.8 - 8.0 K/UL    ABS. LYMPHOCYTES 1.9 0.9 - 3.6 K/UL    ABS. MONOCYTES 1.1 0.05 - 1.2 K/UL    ABS. EOSINOPHILS 0.1 0.0 - 0.4 K/UL    ABS. BASOPHILS 0.0 0.0 - 0.06 K/UL    DF AUTOMATED       Assessment/Plan:      1. Alcohol-induced chronic pancreatitis ([facility] [address] - Patient given result of last abd imaging and tried to explain findings. I asked him to discuss  Results with GI specialist for further plan if any and if recommendation by radiology should be followed given that  cummulative radiation can be an issue long term. Peritoneal drainage functioning well. - CBC WITH AUTOMATED DIFF; Future  - METABOLIC PANEL, COMPREHENSIVE; Future  - LIPASE; Future    2. Bilateral leg edema - Labs as ordered. Mild - Plan is to monitor for now    Lab review: orders written for new lab studies as appropriate; see orders. I have discussed the diagnosis with the patient and the intended plan as seen in the above orders. The patient has received an after-visit summary and questions were answered concerning future plans. I have discussed medication side effects and warnings with the patient as well. I have reviewed the plan of care with the patient, accepted their input and they are in agreement with the treatment goals. Follow-up Disposition:  Return in about 3 months (around 10/18/2018), or if symptoms worsen or fail to improve.     Andreina Fabian MD

## 2018-07-19 NOTE — PROGRESS NOTES
Pls report normal to baseline monitoring labs. Pls send this results to Dr. Paddy Phan as well patients GI specialist taking care of chronic pancreatitis.

## 2018-07-19 NOTE — TELEPHONE ENCOUNTER
----- Message from Delfin Sood MD sent at 7/19/2018  8:17 AM EDT -----  Pls report normal to baseline monitoring labs. Pls send this results to Dr. Apolinar Soni as well patients GI specialist taking care of chronic pancreatitis.

## 2018-07-19 NOTE — TELEPHONE ENCOUNTER
Patient has been mailed letter in regard to results. Results faxed to Dr. Ayanna Camargo as well. Closing encounter.

## 2018-09-26 ENCOUNTER — TELEPHONE (OUTPATIENT)
Dept: FAMILY MEDICINE CLINIC | Age: 68
End: 2018-09-26

## 2018-12-10 ENCOUNTER — OFFICE VISIT (OUTPATIENT)
Dept: FAMILY MEDICINE CLINIC | Age: 68
End: 2018-12-10

## 2018-12-10 VITALS
RESPIRATION RATE: 17 BRPM | TEMPERATURE: 97.6 F | HEART RATE: 70 BPM | WEIGHT: 138 LBS | HEIGHT: 65 IN | SYSTOLIC BLOOD PRESSURE: 124 MMHG | DIASTOLIC BLOOD PRESSURE: 75 MMHG | BODY MASS INDEX: 22.99 KG/M2 | OXYGEN SATURATION: 100 %

## 2018-12-10 DIAGNOSIS — I10 ESSENTIAL HYPERTENSION: Primary | ICD-10-CM

## 2018-12-10 DIAGNOSIS — R05.8 PRODUCTIVE COUGH: ICD-10-CM

## 2018-12-10 DIAGNOSIS — K64.9 HEMORRHOIDS, UNSPECIFIED HEMORRHOID TYPE: ICD-10-CM

## 2018-12-10 DIAGNOSIS — K86.0 ALCOHOL-INDUCED CHRONIC PANCREATITIS (HCC): ICD-10-CM

## 2018-12-10 DIAGNOSIS — R73.09 ABNORMAL BLOOD SUGAR: ICD-10-CM

## 2018-12-10 DIAGNOSIS — M25.50 MULTIPLE JOINT PAIN: ICD-10-CM

## 2018-12-10 RX ORDER — HYDROCORTISONE 10 MG/G
1 CREAM TOPICAL
Qty: 30 G | Refills: 2 | Status: SHIPPED | OUTPATIENT
Start: 2018-12-10 | End: 2020-01-14

## 2018-12-10 RX ORDER — AMLODIPINE BESYLATE 5 MG/1
5 TABLET ORAL DAILY
Qty: 90 TAB | Refills: 1 | Status: SHIPPED | OUTPATIENT
Start: 2018-12-10 | End: 2019-06-18 | Stop reason: SDUPTHER

## 2018-12-10 NOTE — PROGRESS NOTES
Manan Fang is a 80 y.o. male presents to office for leg swelling and knee and shoulder problems.  Mucus in the mornings for 2 months      Medication list has been reviewed with Manan Fang and updated as of today's date     Health Maintenance items with a due date reviewed with patient:  Health Maintenance Due   Topic Date Due    Hepatitis C Screening  1950    DTaP/Tdap/Td series (1 - Tdap) 01/01/1972    Shingrix Vaccine Age 50> (1 of 2) 01/01/2001    FOBT Q 1 YEAR AGE 50-75  01/01/2001    GLAUCOMA SCREENING Q2Y  01/01/2016    Pneumococcal 65+ Low/Medium Risk (1 of 2 - PCV13) 01/01/2016

## 2018-12-10 NOTE — PROGRESS NOTES
Paco Madrigal Chief Complaint Patient presents with  Leg Swelling  Knee Pain  Shoulder Pain Vitals:  
 12/10/18 1311 BP: 124/75 Pulse: 70 Resp: 17 Temp: 97.6 °F (36.4 °C) TempSrc: Oral  
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 Patient is complaining about bilateral joint pain secondary to osteoarthritis especially if you walking or standing increasing his activity. Has been having productive cough for over 2-month no shortness of breath no chest pain, no hemoptysis. History reviewed. No pertinent past medical history. History reviewed. No pertinent surgical history. Social History Tobacco Use  Smoking status: Never Smoker  Smokeless tobacco: Never Used Substance Use Topics  Alcohol use: No  
 
 
History reviewed. No pertinent family history. Review of Systems Constitutional: Negative for chills, fever, malaise/fatigue and weight loss. HENT: Negative for congestion, ear discharge, ear pain, hearing loss, nosebleeds and tinnitus. Eyes: Negative for blurred vision, double vision and discharge. Respiratory: Positive for cough and sputum production. Negative for hemoptysis and shortness of breath. Cardiovascular: Negative for chest pain, palpitations, claudication and leg swelling. Gastrointestinal: Negative for abdominal pain, blood in stool, constipation, diarrhea, heartburn, melena, nausea and vomiting. Genitourinary: Negative for dysuria, frequency and urgency. Musculoskeletal: Positive for back pain, joint pain and myalgias. Negative for falls and neck pain. Skin: Negative for itching and rash. Neurological: Negative for dizziness, tingling, sensory change, speech change, focal weakness, weakness and headaches. Psychiatric/Behavioral: Negative for depression and suicidal ideas. Physical Exam  
Constitutional: He is oriented to person, place, and time. He appears well-developed and well-nourished. No distress. HENT:  
Head: Normocephalic and atraumatic. Mouth/Throat: No oropharyngeal exudate. Eyes: Conjunctivae are normal. Pupils are equal, round, and reactive to light. Right eye exhibits no discharge. Left eye exhibits no discharge. No scleral icterus. Neck: Normal range of motion. Neck supple. No thyromegaly present. Cardiovascular: Normal rate, regular rhythm and normal heart sounds. Pulmonary/Chest: Effort normal and breath sounds normal. No respiratory distress. He has no rales. Abdominal: Soft. Bowel sounds are normal. He exhibits no distension and no mass. There is no tenderness. There is no rebound. Musculoskeletal: Normal range of motion. He exhibits no edema, tenderness or deformity. Lymphadenopathy:  
  He has no cervical adenopathy. Neurological: He is alert and oriented to person, place, and time. No cranial nerve deficit. Coordination normal.  
Skin: Skin is warm and dry. No rash noted. He is not diaphoretic. No erythema. Psychiatric: He has a normal mood and affect. Judgment and thought content normal.  
Nursing note and vitals reviewed. Assessment and plan  
 
 
Plan of care has been discussed with the patient, he agrees to the plan and verbalized understanding. All his questions were answered More than 50% of the time spent in this visit was counseling the patient about his illness and treatment options 1. [facility] Patient following up with gastroenterology Carlos Alberto Centeno MD 
 
 
 
 
2. Essential hypertension Blood pressure is within normal limits today patient stated that he is not adherent to his medication I strongly advised him blood pressure medication on a daily basis. - amLODIPine (NORVASC) 5 mg tablet; Take 1 Tab by mouth daily. Dispense: 90 Tab; Refill: 1 3. Hemorrhoids, unspecified hemorrhoid type 
 
- hydrocortisone (PROCTOCORT) 1 % rectal cream; Apply 1 Each to affected area daily as needed for Skin Irritation, Itching or Hemorrhoids. APPLY TO RECTAL AREA  Dispense: 30 g; Refill: 2 Current Outpatient Medications Medication Sig Dispense Refill  amLODIPine (NORVASC) 5 mg tablet Take 1 Tab by mouth daily. 90 Tab 1  
 hydrocortisone (PROCTOCORT) 1 % rectal cream Apply 1 Each to affected area daily as needed for Skin Irritation, Itching or Hemorrhoids. APPLY TO RECTAL AREA 30 g 2  
 lipase-protease-amylase (CREON 23292) 24,000-76,000 -120,000 unit capsule Take 1 Cap by mouth three (3) times daily (with meals). 270 Cap 1  
 glucose blood VI test strips (ONETOUCH VERIO) strip Use as directed 50 Strip 3  Lancets misc For one touch verio Use as directed 1 Package 11  
 acetaminophen (TYLENOL) 325 mg tablet Take  by mouth every four (4) hours as needed for Pain. Anell Citron robin by Does Not Apply route. Patient Active Problem List  
 Diagnosis Date Noted  Alcohol-induced chronic pancreatitis ([facility] [address] 12/10/2018  Essential hypertension 12/10/2018 Results for orders placed or performed during the hospital encounter of 07/18/18 CBC WITH AUTOMATED DIFF Result Value Ref Range WBC 9.4 4.6 - 13.2 K/uL  
 RBC 3.90 (L) 4.70 - 5.50 M/uL  
 HGB 11.7 (L) 13.0 - 16.0 g/dL HCT 35.3 (L) 36.0 - 48.0 % MCV 90.5 74.0 - 97.0 FL  
 MCH 30.0 24.0 - 34.0 PG  
 MCHC 33.1 31.0 - 37.0 g/dL  
 RDW 15.4 (H) 11.6 - 14.5 % PLATELET 653 559 - 703 K/uL MPV 10.0 9.2 - 11.8 FL  
 NEUTROPHILS 47 40 - 73 % LYMPHOCYTES 44 21 - 52 % MONOCYTES 7 3 - 10 % EOSINOPHILS 2 0 - 5 % BASOPHILS 0 0 - 2 %  
 ABS. NEUTROPHILS 4.4 1.8 - 8.0 K/UL ABS. LYMPHOCYTES 4.1 (H) 0.9 - 3.6 K/UL  
 ABS. MONOCYTES 0.7 0.05 - 1.2 K/UL  
 ABS. EOSINOPHILS 0.2 0.0 - 0.4 K/UL  
 ABS. BASOPHILS 0.0 0.0 - 0.1 K/UL  
 DF AUTOMATED METABOLIC PANEL, COMPREHENSIVE Result Value Ref Range Sodium 143 136 - 145 mmol/L Potassium 3.8 3.5 - 5.5 mmol/L Chloride 106 100 - 108 mmol/L  
 CO2 29 21 - 32 mmol/L Anion gap 8 3.0 - 18 mmol/L Glucose 157 (H) 74 - 99 mg/dL BUN 20 (H) 7.0 - 18 MG/DL Creatinine 0.86 0.6 - 1.3 MG/DL  
 BUN/Creatinine ratio 23 (H) 12 - 20 GFR est AA >60 >60 ml/min/1.73m2 GFR est non-AA >60 >60 ml/min/1.73m2 Calcium 8.4 (L) 8.5 - 10.1 MG/DL Bilirubin, total 0.3 0.2 - 1.0 MG/DL  
 ALT (SGPT) 26 16 - 61 U/L  
 AST (SGOT) 22 15 - 37 U/L Alk. phosphatase 70 45 - 117 U/L Protein, total 7.6 6.4 - 8.2 g/dL Albumin 4.1 3.4 - 5.0 g/dL Globulin 3.5 2.0 - 4.0 g/dL A-G Ratio 1.2 0.8 - 1.7 LIPASE Result Value Ref Range Lipase 116 73 - 393 U/L No visits with results within 3 Month(s) from this visit. Latest known visit with results is:  
Hospital Outpatient Visit on 07/18/2018 Component Date Value Ref Range Status  WBC 07/18/2018 9.4  4.6 - 13.2 K/uL Final  
 RBC 07/18/2018 3.90* 4.70 - 5.50 M/uL Final  
 HGB 07/18/2018 11.7* 13.0 - 16.0 g/dL Final  
 HCT 07/18/2018 35.3* 36.0 - 48.0 % Final  
 MCV 07/18/2018 90.5  74.0 - 97.0 FL Final  
 MCH 07/18/2018 30.0  24.0 - 34.0 PG Final  
 MCHC 07/18/2018 33.1  31.0 - 37.0 g/dL Final  
 RDW 07/18/2018 15.4* 11.6 - 14.5 % Final  
 PLATELET 21/38/5148 972  135 - 420 K/uL Final  
 MPV 07/18/2018 10.0  9.2 - 11.8 FL Final  
 NEUTROPHILS 07/18/2018 47  40 - 73 % Final  
 LYMPHOCYTES 07/18/2018 44  21 - 52 % Final  
 MONOCYTES 07/18/2018 7  3 - 10 % Final  
 EOSINOPHILS 07/18/2018 2  0 - 5 % Final  
 BASOPHILS 07/18/2018 0  0 - 2 % Final  
 ABS. NEUTROPHILS 07/18/2018 4.4  1.8 - 8.0 K/UL Final  
 ABS.  LYMPHOCYTES 07/18/2018 4.1* 0.9 - 3.6 K/UL Final  
  ABS. MONOCYTES 07/18/2018 0.7  0.05 - 1.2 K/UL Final  
 ABS. EOSINOPHILS 07/18/2018 0.2  0.0 - 0.4 K/UL Final  
 ABS. BASOPHILS 07/18/2018 0.0  0.0 - 0.1 K/UL Final  
 DF 07/18/2018 AUTOMATED    Final  
 Sodium 07/18/2018 143  136 - 145 mmol/L Final  
 Potassium 07/18/2018 3.8  3.5 - 5.5 mmol/L Final  
 Chloride 07/18/2018 106  100 - 108 mmol/L Final  
 CO2 07/18/2018 29  21 - 32 mmol/L Final  
 Anion gap 07/18/2018 8  3.0 - 18 mmol/L Final  
 Glucose 07/18/2018 157* 74 - 99 mg/dL Final  
 BUN 07/18/2018 20* 7.0 - 18 MG/DL Final  
 Creatinine 07/18/2018 0.86  0.6 - 1.3 MG/DL Final  
 BUN/Creatinine ratio 07/18/2018 23* 12 - 20   Final  
 GFR est AA 07/18/2018 >60  >60 ml/min/1.73m2 Final  
 GFR est non-AA 07/18/2018 >60  >60 ml/min/1.73m2 Final  
 Comment: (NOTE) Estimated GFR is calculated using the Modification of Diet in Renal  
Disease (MDRD) Study equation, reported for both  Americans [facility]) and non- Americans (GFRNA), and normalized to 1.73m2  
body surface area. The physician must decide which value applies to  
the patient. The MDRD study equation should only be used in  
individuals age 25 or older. It has not been validated for the  
following: pregnant women, patients with serious comorbid conditions,  
or on certain medications, or persons with extremes of body size,  
muscle mass, or nutritional status.  Calcium 07/18/2018 8.4* 8.5 - 10.1 MG/DL Final  
 Bilirubin, total 07/18/2018 0.3  0.2 - 1.0 MG/DL Final  
 ALT (SGPT) 07/18/2018 26  16 - 61 U/L Final  
 AST (SGOT) 07/18/2018 22  15 - 37 U/L Final  
 Alk. phosphatase 07/18/2018 70  45 - 117 U/L Final  
 Protein, total 07/18/2018 7.6  6.4 - 8.2 g/dL Final  
 Albumin 07/18/2018 4.1  3.4 - 5.0 g/dL Final  
 Globulin 07/18/2018 3.5  2.0 - 4.0 g/dL Final  
 A-G Ratio 07/18/2018 1.2  0.8 - 1.7   Final  
 Lipase 07/18/2018 116  73 - 393 U/L Final  
  
 
 
Follow-up Disposition: Not on File

## 2018-12-11 NOTE — PROGRESS NOTES
[address]     Chief Complaint   Patient presents with    Leg Swelling    Knee Pain    Shoulder Pain     Vitals:    12/10/18 1311   BP: 124/75   Pulse: 70   Resp: 17   Temp: 97.6 °F (36.4 °C)   TempSrc: Oral   SpO2: 100%   Weight: 138 lb (62.6 kg)   Height: 5' 5\" (1.651 m)   PainSc:   0 - No pain         HPI:HPI: She is here to establish care since his previous provider has left the clinic, patient has history of chronic pancreatitis and pancreatic pseudocyst due to alcohol abuse, he had a retroperitoneal shunt last year,    Patient is still smoking . Patient is complaining about bilateral joint pain secondary to osteoarthritis especially if you walking or standing increasing his activity. Has been having productive cough for over 2-month no shortness of breath no chest pain, no hemoptysis. History reviewed. No pertinent past medical history. History reviewed. No pertinent surgical history. Social History     Tobacco Use    Smoking status: Never Smoker    Smokeless tobacco: Never Used   Substance Use Topics    Alcohol use: No       History reviewed. No pertinent family history. Review of Systems   Constitutional: Negative for chills, fever, malaise/fatigue and weight loss. HENT: Negative for congestion, ear discharge, ear pain, hearing loss, nosebleeds, sinus pain and sore throat. Eyes: Negative for blurred vision, double vision and discharge. Respiratory: Positive for cough and sputum production. Negative for hemoptysis and shortness of breath. Cardiovascular: Negative for chest pain, palpitations, claudication and leg swelling. Gastrointestinal: Negative for abdominal pain, constipation, diarrhea, nausea and vomiting. Genitourinary: Negative for dysuria, frequency and urgency. Musculoskeletal: Positive for joint pain and myalgias. Negative for back pain and neck pain. Skin: Negative for itching and rash.    Neurological: Negative for dizziness, tingling, sensory change, speech change, focal weakness, weakness and headaches. Psychiatric/Behavioral: Negative for depression and suicidal ideas. Physical Exam   Constitutional: He is oriented to person, place, and time. He appears well-developed and well-nourished. No distress. HENT:   Head: Normocephalic and atraumatic. Mouth/Throat: No oropharyngeal exudate. Eyes: Conjunctivae are normal. Pupils are equal, round, and reactive to light. Right eye exhibits no discharge. Left eye exhibits no discharge. No scleral icterus. Neck: Normal range of motion. Neck supple. No thyromegaly present. Cardiovascular: Normal rate, regular rhythm and normal heart sounds. Pulmonary/Chest: Effort normal and breath sounds normal. No respiratory distress. He has no rales. Abdominal: Soft. Bowel sounds are normal. He exhibits no distension and no mass. There is no tenderness. There is no rebound. Musculoskeletal: Normal range of motion. He exhibits no edema, tenderness or deformity. Lymphadenopathy:     He has no cervical adenopathy. Neurological: He is alert and oriented to person, place, and time. No cranial nerve deficit. Coordination normal.   Skin: Skin is warm and dry. No rash noted. He is not diaphoretic. No erythema. Psychiatric: He has a normal mood and affect. Judgment and thought content normal.   Nursing note and vitals reviewed. Assessment and plan     Plan of care has been discussed with the patient, he agrees to the plan and verbalized understanding. All his questions were answered  More than 50% of the time spent in this visit was counseling the patient about  illness and treatment options         1. Alcohol-induced chronic pancreatitis ([facility] [address]  To peritoneal shunt placed and he is following up with Dr. Vianney Eastman gastroenterologist    2. Essential hypertension  Patient was advised to be adherent to his amlodipine and take it on a daily basis  - amLODIPine (NORVASC) 5 mg tablet; Take 1 Tab by mouth daily. Dispense: 90 Tab; Refill: 1    3. Hemorrhoids, unspecified hemorrhoid type     - hydrocortisone (PROCTOCORT) 1 % rectal cream; Apply 1 Each to affected area daily as needed for Skin Irritation, Itching or Hemorrhoids. APPLY TO RECTAL AREA  Dispense: 30 g; Refill: 2    4. Multiple joint pain  Osteoarthritis patient could take Tylenol as needed for pain    5. Productive cough  Is negative for any pathology, no bronchitis or pneumonia patient was strongly advised to quit smoking  - XR CHEST PA LAT; Future    6. Abnormal blood sugar  Abnormal blood sugar level will check hemoglobin A1c  - HEMOGLOBIN A1C W/O EAG; Future    Current Outpatient Medications   Medication Sig Dispense Refill    amLODIPine (NORVASC) 5 mg tablet Take 1 Tab by mouth daily. 90 Tab 1    hydrocortisone (PROCTOCORT) 1 % rectal cream Apply 1 Each to affected area daily as needed for Skin Irritation, Itching or Hemorrhoids. APPLY TO RECTAL AREA 30 g 2    lipase-protease-amylase (CREON 70430) 24,000-76,000 -120,000 unit capsule Take 1 Cap by mouth three (3) times daily (with meals). 270 Cap 1    glucose blood VI test strips (ONETOUCH VERIO) strip Use as directed 50 Strip 3    Lancets misc For one touch verio    Use as directed 1 Package 11    acetaminophen (TYLENOL) 325 mg tablet Take  by mouth every four (4) hours as needed for Pain. Yobany kelly by Does Not Apply route.          Patient Active Problem List    Diagnosis Date Noted    Alcohol-induced chronic pancreatitis ([facility] [address] 12/10/2018    Essential hypertension 12/10/2018     Results for orders placed or performed during the hospital encounter of 07/18/18   CBC WITH AUTOMATED DIFF   Result Value Ref Range    WBC 9.4 4.6 - 13.2 K/uL    RBC 3.90 (L) 4.70 - 5.50 M/uL    HGB 11.7 (L) 13.0 - 16.0 g/dL    HCT 35.3 (L) 36.0 - 48.0 %    MCV 90.5 74.0 - 97.0 FL    MCH 30.0 24.0 - 34.0 PG    MCHC 33.1 31.0 - 37.0 g/dL    RDW 15.4 (H) 11.6 - 14.5 %    PLATELET 617 670 - 745 K/uL    MPV 10.0 9.2 - 11.8 FL    NEUTROPHILS 47 40 - 73 %    LYMPHOCYTES 44 21 - 52 %    MONOCYTES 7 3 - 10 %    EOSINOPHILS 2 0 - 5 %    BASOPHILS 0 0 - 2 %    ABS. NEUTROPHILS 4.4 1.8 - 8.0 K/UL    ABS. LYMPHOCYTES 4.1 (H) 0.9 - 3.6 K/UL    ABS. MONOCYTES 0.7 0.05 - 1.2 K/UL    ABS. EOSINOPHILS 0.2 0.0 - 0.4 K/UL    ABS. BASOPHILS 0.0 0.0 - 0.1 K/UL    DF AUTOMATED     METABOLIC PANEL, COMPREHENSIVE   Result Value Ref Range    Sodium 143 136 - 145 mmol/L    Potassium 3.8 3.5 - 5.5 mmol/L    Chloride 106 100 - 108 mmol/L    CO2 29 21 - 32 mmol/L    Anion gap 8 3.0 - 18 mmol/L    Glucose 157 (H) 74 - 99 mg/dL    BUN 20 (H) 7.0 - 18 MG/DL    Creatinine 0.86 0.6 - 1.3 MG/DL    BUN/Creatinine ratio 23 (H) 12 - 20      GFR est AA >60 >60 ml/min/1.73m2    GFR est non-AA >60 >60 ml/min/1.73m2    Calcium 8.4 (L) 8.5 - 10.1 MG/DL    Bilirubin, total 0.3 0.2 - 1.0 MG/DL    ALT (SGPT) 26 16 - 61 U/L    AST (SGOT) 22 15 - 37 U/L    Alk. phosphatase 70 45 - 117 U/L    Protein, total 7.6 6.4 - 8.2 g/dL    Albumin 4.1 3.4 - 5.0 g/dL    Globulin 3.5 2.0 - 4.0 g/dL    A-G Ratio 1.2 0.8 - 1.7     LIPASE   Result Value Ref Range    Lipase 116 73 - 393 U/L     No visits with results within 3 Month(s) from this visit.    Latest known visit with results is:   Hospital Outpatient Visit on 07/18/2018   Component Date Value Ref Range Status    WBC 07/18/2018 9.4  4.6 - 13.2 K/uL Final    RBC 07/18/2018 3.90* 4.70 - 5.50 M/uL Final    HGB 07/18/2018 11.7* 13.0 - 16.0 g/dL Final    HCT 07/18/2018 35.3* 36.0 - 48.0 % Final    MCV 07/18/2018 90.5  74.0 - 97.0 FL Final    MCH 07/18/2018 30.0  24.0 - 34.0 PG Final    MCHC 07/18/2018 33.1  31.0 - 37.0 g/dL Final    RDW 07/18/2018 15.4* 11.6 - 14.5 % Final    PLATELET 59/52/5100 203  135 - 420 K/uL Final    MPV 07/18/2018 10.0  9.2 - 11.8 FL Final    NEUTROPHILS 07/18/2018 47  40 - 73 % Final    LYMPHOCYTES 07/18/2018 44  21 - 52 % Final    MONOCYTES 07/18/2018 7  3 - 10 % Final    EOSINOPHILS 07/18/2018 2  0 - 5 % Final    BASOPHILS 07/18/2018 0  0 - 2 % Final    ABS. NEUTROPHILS 07/18/2018 4.4  1.8 - 8.0 K/UL Final    ABS. LYMPHOCYTES 07/18/2018 4.1* 0.9 - 3.6 K/UL Final    ABS. MONOCYTES 07/18/2018 0.7  0.05 - 1.2 K/UL Final    ABS. EOSINOPHILS 07/18/2018 0.2  0.0 - 0.4 K/UL Final    ABS. BASOPHILS 07/18/2018 0.0  0.0 - 0.1 K/UL Final    DF 07/18/2018 AUTOMATED    Final    Sodium 07/18/2018 143  136 - 145 mmol/L Final    Potassium 07/18/2018 3.8  3.5 - 5.5 mmol/L Final    Chloride 07/18/2018 106  100 - 108 mmol/L Final    CO2 07/18/2018 29  21 - 32 mmol/L Final    Anion gap 07/18/2018 8  3.0 - 18 mmol/L Final    Glucose 07/18/2018 157* 74 - 99 mg/dL Final    BUN 07/18/2018 20* 7.0 - 18 MG/DL Final    Creatinine 07/18/2018 0.86  0.6 - 1.3 MG/DL Final    BUN/Creatinine ratio 07/18/2018 23* 12 - 20   Final    GFR est AA 07/18/2018 >60  >60 ml/min/1.73m2 Final    GFR est non-AA 07/18/2018 >60  >60 ml/min/1.73m2 Final    Comment: (NOTE)  Estimated GFR is calculated using the Modification of Diet in Renal   Disease (MDRD) Study equation, reported for both  Americans   (GFRAA) and non- Americans (GFRNA), and normalized to 1.73m2   body surface area. The physician must decide which value applies to   the patient. The MDRD study equation should only be used in   individuals age 25 or older. It has not been validated for the   following: pregnant women, patients with serious comorbid conditions,   or on certain medications, or persons with extremes of body size,   muscle mass, or nutritional status.  Calcium 07/18/2018 8.4* 8.5 - 10.1 MG/DL Final    Bilirubin, total 07/18/2018 0.3  0.2 - 1.0 MG/DL Final    ALT (SGPT) 07/18/2018 26  16 - 61 U/L Final    AST (SGOT) 07/18/2018 22  15 - 37 U/L Final    Alk.  phosphatase 07/18/2018 70  45 - 117 U/L Final    Protein, total 07/18/2018 7.6  6.4 - 8.2 g/dL Final    Albumin 07/18/2018 4.1  3.4 - 5.0 g/dL Final    Globulin 07/18/2018 3.5  2.0 - 4.0 g/dL Final    A-G Ratio 07/18/2018 1.2  0.8 - 1.7   Final    Lipase 07/18/2018 116  73 - 393 U/L Final          Follow-up Disposition: Not on File

## 2019-03-25 NOTE — TELEPHONE ENCOUNTER
Pt calling to request medication   be sent to [address] [place] [address], [facility] [address] Pts last appt was 12/10/18, next appt is not scheduled. Advised pt of 72 hour time frame for refill requests. Please advise. Requested Prescriptions     Pending Prescriptions Disp Refills    lipase-protease-amylase (CREON 24,000) 24,000-76,000 -120,000 unit capsule 270 Cap 1     Sig: Take 1 Cap by mouth three (3) times daily (with meals).

## 2019-06-18 DIAGNOSIS — I10 ESSENTIAL HYPERTENSION: ICD-10-CM

## 2019-06-18 RX ORDER — AMLODIPINE BESYLATE 5 MG/1
5 TABLET ORAL DAILY
Qty: 30 TAB | Refills: 0 | Status: SHIPPED | OUTPATIENT
Start: 2019-06-18 | End: 2020-01-14 | Stop reason: SDUPTHER

## 2019-06-18 NOTE — TELEPHONE ENCOUNTER
Pt calling to request medication refill of:    triamcinolone acetonide (KENALOG) 0.5 % ointment   Pt is requesting as many is allowed by law  Requested Prescriptions     Pending Prescriptions Disp Refills    amLODIPine (NORVASC) 5 mg tablet 90 Tab 1     Sig: Take 1 Tab by mouth daily. be sent to [address] [place] [address], [id number] [facility]  Pt has about 5 tabs remaining. Pts last appt was 12/10/18  Advised pt of 72 hour time frame for refill requests. Please advise.

## 2019-10-22 NOTE — LETTER
7/19/2018 8:47 AM 
 
Mr. Yee [place] [address] [place] [id number] Dear [facility] I have reviewed your recent lab tests and have found the results to be within normal ranges. Please call if you have any questions [phone] . Sincerely, Krista Avilez MD 
 (0) indicator not present

## 2020-01-14 ENCOUNTER — OFFICE VISIT (OUTPATIENT)
Dept: FAMILY MEDICINE CLINIC | Age: 70
End: 2020-01-14

## 2020-01-14 ENCOUNTER — HOSPITAL ENCOUNTER (OUTPATIENT)
Dept: LAB | Age: 70
Discharge: HOME | End: 2020-01-14
Payer: COMMERCIAL

## 2020-01-14 VITALS
BODY MASS INDEX: 21.69 KG/M2 | HEART RATE: 80 BPM | DIASTOLIC BLOOD PRESSURE: 75 MMHG | SYSTOLIC BLOOD PRESSURE: 121 MMHG | TEMPERATURE: 97.5 F | OXYGEN SATURATION: 98 % | RESPIRATION RATE: 18 BRPM | WEIGHT: 130.2 LBS | HEIGHT: 65 IN

## 2020-01-14 DIAGNOSIS — I10 ESSENTIAL HYPERTENSION: ICD-10-CM

## 2020-01-14 DIAGNOSIS — R73.09 ABNORMAL BLOOD SUGAR: Primary | ICD-10-CM

## 2020-01-14 DIAGNOSIS — Z11.59 NEED FOR HEPATITIS C SCREENING TEST: ICD-10-CM

## 2020-01-14 DIAGNOSIS — R73.09 ABNORMAL BLOOD SUGAR: ICD-10-CM

## 2020-01-14 LAB
ALBUMIN SERPL-MCNC: 4 G/DL (ref 3.4–5)
ALBUMIN/GLOB SERPL: 1 {RATIO} (ref 0.8–1.7)
ALP SERPL-CCNC: 105 U/L (ref 45–117)
ALT SERPL-CCNC: 14 U/L (ref 16–61)
ANION GAP SERPL CALC-SCNC: 6 MMOL/L (ref 3–18)
AST SERPL-CCNC: 13 U/L (ref 10–38)
BASOPHILS # BLD: 0 K/UL (ref 0–0.1)
BASOPHILS NFR BLD: 0 % (ref 0–2)
BILIRUB SERPL-MCNC: 0.8 MG/DL (ref 0.2–1)
BUN SERPL-MCNC: 11 MG/DL (ref 7–18)
BUN/CREAT SERPL: 12 (ref 12–20)
CALCIUM SERPL-MCNC: 9.5 MG/DL (ref 8.5–10.1)
CHLORIDE SERPL-SCNC: 102 MMOL/L (ref 100–111)
CHOLEST SERPL-MCNC: 260 MG/DL
CO2 SERPL-SCNC: 28 MMOL/L (ref 21–32)
CREAT SERPL-MCNC: 0.92 MG/DL (ref 0.6–1.3)
DIFFERENTIAL METHOD BLD: NORMAL
EOSINOPHIL # BLD: 0.3 K/UL (ref 0–0.4)
EOSINOPHIL NFR BLD: 3 % (ref 0–5)
ERYTHROCYTE [DISTWIDTH] IN BLOOD BY AUTOMATED COUNT: 13 % (ref 11.6–14.5)
GLOBULIN SER CALC-MCNC: 4 G/DL (ref 2–4)
GLUCOSE SERPL-MCNC: 305 MG/DL (ref 74–99)
HBA1C MFR BLD: 9.5 % (ref 4.2–5.6)
HCT VFR BLD AUTO: 43.9 % (ref 36–48)
HDLC SERPL-MCNC: 44 MG/DL (ref 40–60)
HDLC SERPL: 5.9 {RATIO} (ref 0–5)
HGB BLD-MCNC: 15.8 G/DL (ref 13–16)
LDLC SERPL CALC-MCNC: 172.4 MG/DL (ref 0–100)
LIPID PROFILE,FLP: ABNORMAL
LYMPHOCYTES # BLD: 3.2 K/UL (ref 0.9–3.6)
LYMPHOCYTES NFR BLD: 33 % (ref 21–52)
MCH RBC QN AUTO: 30.3 PG (ref 24–34)
MCHC RBC AUTO-ENTMCNC: 36 G/DL (ref 31–37)
MCV RBC AUTO: 84.3 FL (ref 74–97)
MONOCYTES # BLD: 0.6 K/UL (ref 0.05–1.2)
MONOCYTES NFR BLD: 6 % (ref 3–10)
NEUTS SEG # BLD: 5.7 K/UL (ref 1.8–8)
NEUTS SEG NFR BLD: 58 % (ref 40–73)
PLATELET # BLD AUTO: 291 K/UL (ref 135–420)
PMV BLD AUTO: 9.9 FL (ref 9.2–11.8)
POTASSIUM SERPL-SCNC: 4 MMOL/L (ref 3.5–5.5)
PROT SERPL-MCNC: 8 G/DL (ref 6.4–8.2)
RBC # BLD AUTO: 5.21 M/UL (ref 4.7–5.5)
SODIUM SERPL-SCNC: 136 MMOL/L (ref 136–145)
TRIGL SERPL-MCNC: 218 MG/DL (ref ?–150)
VLDLC SERPL CALC-MCNC: 43.6 MG/DL
WBC # BLD AUTO: 9.9 K/UL (ref 4.6–13.2)

## 2020-01-14 PROCEDURE — 80053 COMPREHEN METABOLIC PANEL: CPT

## 2020-01-14 PROCEDURE — 85025 COMPLETE CBC W/AUTO DIFF WBC: CPT

## 2020-01-14 PROCEDURE — 83036 HEMOGLOBIN GLYCOSYLATED A1C: CPT

## 2020-01-14 PROCEDURE — 80061 LIPID PANEL: CPT

## 2020-01-14 PROCEDURE — 36415 COLL VENOUS BLD VENIPUNCTURE: CPT

## 2020-01-14 RX ORDER — AMLODIPINE BESYLATE 5 MG/1
5 TABLET ORAL DAILY
Qty: 90 TAB | Refills: 1 | Status: SHIPPED | OUTPATIENT
Start: 2020-01-14 | End: 2020-10-26 | Stop reason: SDUPTHER

## 2020-01-14 RX ORDER — TRIAMCINOLONE ACETONIDE 5 MG/G
OINTMENT TOPICAL 2 TIMES DAILY
Qty: 30 G | Refills: 2 | Status: CANCELLED | OUTPATIENT
Start: 2020-01-14

## 2020-01-14 NOTE — PROGRESS NOTES
[address]     Chief Complaint   Patient presents with    Hypertension     f/u     Vitals:    01/14/20 0937   BP: 121/75   Pulse: 80   Resp: 18   Temp: 97.5 °F (36.4 °C)   TempSrc: Oral   SpO2: 98%   Weight: 130 lb 3.2 oz (59.1 kg)   Height: 5' 5\" (1.651 m)   PainSc:   0 - No pain         HPI:Mr. Sierra Majano is here for follow-up and medication refill, he has not been seen since December 2018. He does have alcohol induced chronic pancreatitis and he does follow-up with gastroenterology Dr. Robert Kinds      Patient does not have any acute complaints today    Blood pressures well controlled on Norvasc 5 mg daily    No past medical history on file. No past surgical history on file. Social History     Tobacco Use    Smoking status: Never Smoker    Smokeless tobacco: Never Used   Substance Use Topics    Alcohol use: No       No family history on file. Review of Systems   Constitutional: Negative for chills, fever, malaise/fatigue and weight loss. HENT: Negative for congestion, ear discharge, ear pain, hearing loss, nosebleeds and sinus pain. Eyes: Negative for blurred vision, double vision and discharge. Respiratory: Negative for cough, hemoptysis, sputum production, shortness of breath and stridor. Cardiovascular: Negative for chest pain, palpitations, claudication and leg swelling. Gastrointestinal: Negative for abdominal pain, constipation, diarrhea, nausea and vomiting. Genitourinary: Negative for dysuria, frequency, hematuria and urgency. Musculoskeletal: Negative for back pain, joint pain, myalgias and neck pain. Skin: Negative for itching and rash. Neurological: Negative for dizziness, tingling, sensory change, speech change, focal weakness, seizures, weakness and headaches. Psychiatric/Behavioral: Negative for depression, hallucinations, substance abuse and suicidal ideas. The patient is not nervous/anxious and does not have insomnia.         Physical Exam  Vitals signs and nursing note reviewed. Constitutional:       General: He is not in acute distress. Appearance: He is well-developed. He is not diaphoretic. HENT:      Head: Normocephalic and atraumatic. Mouth/Throat:      Pharynx: No oropharyngeal exudate. Eyes:      General: No scleral icterus. Right eye: No discharge. Left eye: No discharge. Conjunctiva/sclera: Conjunctivae normal.      Pupils: Pupils are equal, round, and reactive to light. Neck:      Musculoskeletal: Normal range of motion and neck supple. Thyroid: No thyromegaly. Cardiovascular:      Rate and Rhythm: Normal rate and regular rhythm. Heart sounds: Normal heart sounds. Pulmonary:      Effort: Pulmonary effort is normal. No respiratory distress. Breath sounds: Normal breath sounds. No rales. Abdominal:      General: Bowel sounds are normal. There is no distension. Palpations: Abdomen is soft. There is no mass. Tenderness: There is no tenderness. There is no rebound. Musculoskeletal: Normal range of motion. General: No tenderness or deformity. Lymphadenopathy:      Cervical: No cervical adenopathy. Skin:     General: Skin is warm and dry. Findings: No erythema or rash. Neurological:      Mental Status: He is alert and oriented to person, place, and time. Cranial Nerves: No cranial nerve deficit. Coordination: Coordination normal.   Psychiatric:         Thought Content: Thought content normal.         Judgment: Judgment normal.          Assessment and plan     Plan of care has been discussed with the patient, he agrees to the plan and verbalized understanding. All his questions were answered  More than 50% of the time spent in this visit was counseling the patient about  illness and treatment options         1. Essential hypertension  Well-controlled continue amlodipine 5 mg daily- amLODIPine (NORVASC) 5 mg tablet; Take 1 Tab by mouth daily.   Dispense: 90 Tab; Refill: 1  - METABOLIC PANEL, COMPREHENSIVE; Future  - CBC WITH AUTOMATED DIFF; Future  - LIPID PANEL; Future    2. Abnormal blood sugar  Reviewing the patient result in care everywhere he has elevated blood sugar need blood sugar recheck and hemoglobin A1c as well  - METABOLIC PANEL, COMPREHENSIVE; Future  - CBC WITH AUTOMATED DIFF; Future  - HEMOGLOBIN A1C W/O EAG; Future          Current Outpatient Medications   Medication Sig Dispense Refill    amLODIPine (NORVASC) 5 mg tablet Take 1 Tab by mouth daily. 90 Tab 1    lipase-protease-amylase (CREON 24,000) 24,000-76,000 -120,000 unit capsule Take 1 Cap by mouth three (3) times daily (with meals). 270 Cap 1    glucose blood VI test strips (ONETOUCH VERIO) strip Use as directed 50 Strip 3    Lancets misc For one touch verio    Use as directed 1 Package 11    acetaminophen (TYLENOL) 325 mg tablet Take  by mouth every four (4) hours as needed for Pain. Jeane Starling robin by Does Not Apply route. Patient Active Problem List    Diagnosis Date Noted    Alcohol-induced chronic pancreatitis ([facility] [address] 12/10/2018    Essential hypertension 12/10/2018     Results for orders placed or performed during the hospital encounter of 07/18/18   CBC WITH AUTOMATED DIFF   Result Value Ref Range    WBC 9.4 4.6 - 13.2 K/uL    RBC 3.90 (L) 4.70 - 5.50 M/uL    HGB 11.7 (L) 13.0 - 16.0 g/dL    HCT 35.3 (L) 36.0 - 48.0 %    MCV 90.5 74.0 - 97.0 FL    MCH 30.0 24.0 - 34.0 PG    MCHC 33.1 31.0 - 37.0 g/dL    RDW 15.4 (H) 11.6 - 14.5 %    PLATELET 781 459 - 399 K/uL    MPV 10.0 9.2 - 11.8 FL    NEUTROPHILS 47 40 - 73 %    LYMPHOCYTES 44 21 - 52 %    MONOCYTES 7 3 - 10 %    EOSINOPHILS 2 0 - 5 %    BASOPHILS 0 0 - 2 %    ABS. NEUTROPHILS 4.4 1.8 - 8.0 K/UL    ABS. LYMPHOCYTES 4.1 (H) 0.9 - 3.6 K/UL    ABS. MONOCYTES 0.7 0.05 - 1.2 K/UL    ABS. EOSINOPHILS 0.2 0.0 - 0.4 K/UL    ABS.  BASOPHILS 0.0 0.0 - 0.1 K/UL    DF AUTOMATED     METABOLIC PANEL, COMPREHENSIVE   Result Value Ref Range Sodium 143 136 - 145 mmol/L    Potassium 3.8 3.5 - 5.5 mmol/L    Chloride 106 100 - 108 mmol/L    CO2 29 21 - 32 mmol/L    Anion gap 8 3.0 - 18 mmol/L    Glucose 157 (H) 74 - 99 mg/dL    BUN 20 (H) 7.0 - 18 MG/DL    Creatinine 0.86 0.6 - 1.3 MG/DL    BUN/Creatinine ratio 23 (H) 12 - 20      GFR est AA >60 >60 ml/min/1.73m2    GFR est non-AA >60 >60 ml/min/1.73m2    Calcium 8.4 (L) 8.5 - 10.1 MG/DL    Bilirubin, total 0.3 0.2 - 1.0 MG/DL    ALT (SGPT) 26 16 - 61 U/L    AST (SGOT) 22 15 - 37 U/L    Alk. phosphatase 70 45 - 117 U/L    Protein, total 7.6 6.4 - 8.2 g/dL    Albumin 4.1 3.4 - 5.0 g/dL    Globulin 3.5 2.0 - 4.0 g/dL    A-G Ratio 1.2 0.8 - 1.7     LIPASE   Result Value Ref Range    Lipase 116 73 - 393 U/L     No visits with results within 3 Month(s) from this visit. Latest known visit with results is:   Hospital Outpatient Visit on 07/18/2018   Component Date Value Ref Range Status    WBC 07/18/2018 9.4  4.6 - 13.2 K/uL Final    RBC 07/18/2018 3.90* 4.70 - 5.50 M/uL Final    HGB 07/18/2018 11.7* 13.0 - 16.0 g/dL Final    HCT 07/18/2018 35.3* 36.0 - 48.0 % Final    MCV 07/18/2018 90.5  74.0 - 97.0 FL Final    MCH 07/18/2018 30.0  24.0 - 34.0 PG Final    MCHC 07/18/2018 33.1  31.0 - 37.0 g/dL Final    RDW 07/18/2018 15.4* 11.6 - 14.5 % Final    PLATELET 66/08/9577 941  135 - 420 K/uL Final    MPV 07/18/2018 10.0  9.2 - 11.8 FL Final    NEUTROPHILS 07/18/2018 47  40 - 73 % Final    LYMPHOCYTES 07/18/2018 44  21 - 52 % Final    MONOCYTES 07/18/2018 7  3 - 10 % Final    EOSINOPHILS 07/18/2018 2  0 - 5 % Final    BASOPHILS 07/18/2018 0  0 - 2 % Final    ABS. NEUTROPHILS 07/18/2018 4.4  1.8 - 8.0 K/UL Final    ABS. LYMPHOCYTES 07/18/2018 4.1* 0.9 - 3.6 K/UL Final    ABS. MONOCYTES 07/18/2018 0.7  0.05 - 1.2 K/UL Final    ABS. EOSINOPHILS 07/18/2018 0.2  0.0 - 0.4 K/UL Final    ABS.  BASOPHILS 07/18/2018 0.0  0.0 - 0.1 K/UL Final    DF 07/18/2018 AUTOMATED    Final    Sodium 07/18/2018 143 136 - 145 mmol/L Final    Potassium 07/18/2018 3.8  3.5 - 5.5 mmol/L Final    Chloride 07/18/2018 106  100 - 108 mmol/L Final    CO2 07/18/2018 29  21 - 32 mmol/L Final    Anion gap 07/18/2018 8  3.0 - 18 mmol/L Final    Glucose 07/18/2018 157* 74 - 99 mg/dL Final    BUN 07/18/2018 20* 7.0 - 18 MG/DL Final    Creatinine 07/18/2018 0.86  0.6 - 1.3 MG/DL Final    BUN/Creatinine ratio 07/18/2018 23* 12 - 20   Final    GFR est AA 07/18/2018 >60  >60 ml/min/1.73m2 Final    GFR est non-AA 07/18/2018 >60  >60 ml/min/1.73m2 Final    Comment: (NOTE)  Estimated GFR is calculated using the Modification of Diet in Renal   Disease (MDRD) Study equation, reported for both  Americans   (GFRAA) and non- Americans (GFRNA), and normalized to 1.73m2   body surface area. The physician must decide which value applies to   the patient. The MDRD study equation should only be used in   individuals age 25 or older. It has not been validated for the   following: pregnant women, patients with serious comorbid conditions,   or on certain medications, or persons with extremes of body size,   muscle mass, or nutritional status.  Calcium 07/18/2018 8.4* 8.5 - 10.1 MG/DL Final    Bilirubin, total 07/18/2018 0.3  0.2 - 1.0 MG/DL Final    ALT (SGPT) 07/18/2018 26  16 - 61 U/L Final    AST (SGOT) 07/18/2018 22  15 - 37 U/L Final    Alk. phosphatase 07/18/2018 70  45 - 117 U/L Final    Protein, total 07/18/2018 7.6  6.4 - 8.2 g/dL Final    Albumin 07/18/2018 4.1  3.4 - 5.0 g/dL Final    Globulin 07/18/2018 3.5  2.0 - 4.0 g/dL Final    A-G Ratio 07/18/2018 1.2  0.8 - 1.7   Final    Lipase 07/18/2018 116  73 - 393 U/L Final          Follow-up and Dispositions    · Return in about 6 months (around 7/14/2020).

## 2020-01-14 NOTE — PROGRESS NOTES
Marlyn Alvarez presents today for   Chief Complaint   Patient presents with    Hypertension     f/u       Is someone accompanying this pt? no    Is the patient using any DME equipment during OV? yes    Depression Screening:  3 most recent PHQ Screens 1/14/2020   Little interest or pleasure in doing things Not at all   Feeling down, depressed, irritable, or hopeless Not at all   Total Score PHQ 2 0       Learning Assessment:  Learning Assessment 1/14/2020   PRIMARY LEARNER Patient   HIGHEST LEVEL OF EDUCATION - PRIMARY LEARNER  4 YEARS OF COLLEGE   BARRIERS PRIMARY LEARNER -   CO-LEARNER CAREGIVER -   PRIMARY LANGUAGE ENGLISH   LEARNER PREFERENCE PRIMARY DEMONSTRATION     READING     VIDEOS     LISTENING     PICTURES   ANSWERED BY patient   RELATIONSHIP SELF       Abuse Screening:  Abuse Screening Questionnaire 1/14/2020   Do you ever feel afraid of your partner? N   Are you in a relationship with someone who physically or mentally threatens you? N   Is it safe for you to go home? Y       Fall Risk  Fall Risk Assessment, last 12 mths 6/15/2018   Able to walk? Yes   Fall in past 12 months? Yes   Fall with injury? No   Number of falls in past 12 months 7   Fall Risk Score 7       Health Maintenance reviewed and discussed and ordered per Provider. Health Maintenance Due   Topic Date Due    DTaP/Tdap/Td series (1 - Tdap) 01/01/1962    Shingrix Vaccine Age 50> (1 of 2) 01/01/2001    FOBT Q 1 YEAR AGE 50-75  01/01/2001    GLAUCOMA SCREENING Q2Y  01/01/2016    Pneumococcal 65+ years (1 of 1 - PPSV23) 01/01/2016    Influenza Age 5 to Adult  08/01/2019     Patient declines ALL health maintenance while in office today. He states \"I am only here for my medications\". Pt currently taking Antiplatelet therapy? no    Coordination of Care:  1. Have you been to the ER, urgent care clinic since your last visit? Hospitalized since your last visit? yes    2.  Have you seen or consulted any other health care providers outside of the [address] [place] [address] since your last visit? Include any pap smears or colon screening.  no

## 2020-01-15 NOTE — PROGRESS NOTES
Patient contacted with results per PCP, verified 2 patient identifiers. He is not interested in scheduling an appointment at this time. I expressed the importance of being treated and the complications that it can cause if left untreated. He states \"I will just call you back\".

## 2020-01-15 NOTE — PROGRESS NOTES
Patient has elevated blood sugar, his hemoglobin A1c is 9.5    Blood sugar 305    Also has elevated bad cholesterol 172      Need to schedule follow-up for discussion and treatment

## 2020-10-26 ENCOUNTER — VIRTUAL VISIT (OUTPATIENT)
Dept: FAMILY MEDICINE CLINIC | Age: 70
End: 2020-10-26
Payer: COMMERCIAL

## 2020-10-26 DIAGNOSIS — R73.09 ABNORMAL BLOOD SUGAR: ICD-10-CM

## 2020-10-26 DIAGNOSIS — I10 ESSENTIAL HYPERTENSION: Primary | ICD-10-CM

## 2020-10-26 DIAGNOSIS — K86.0 ALCOHOL-INDUCED CHRONIC PANCREATITIS (HCC): ICD-10-CM

## 2020-10-26 PROCEDURE — 99443 PR PHYS/QHP TELEPHONE EVALUATION 21-30 MIN: CPT | Performed by: LEGAL MEDICINE

## 2020-10-26 RX ORDER — PANCRELIPASE 60000; 12000; 38000 [USP'U]/1; [USP'U]/1; [USP'U]/1
CAPSULE, DELAYED RELEASE PELLETS ORAL
COMMUNITY
Start: 2018-06-14 | End: 2020-10-26 | Stop reason: SDUPTHER

## 2020-10-26 RX ORDER — PANCRELIPASE 24000; 76000; 120000 [USP'U]/1; [USP'U]/1; [USP'U]/1
CAPSULE, DELAYED RELEASE PELLETS ORAL
COMMUNITY
End: 2020-10-26 | Stop reason: SDUPTHER

## 2020-10-26 RX ORDER — AMLODIPINE BESYLATE 5 MG/1
5 TABLET ORAL DAILY
Qty: 90 TAB | Refills: 0 | Status: SHIPPED | OUTPATIENT
Start: 2020-10-26

## 2020-10-26 RX ORDER — HYDROCODONE BITARTRATE AND ACETAMINOPHEN 5; 325 MG/1; MG/1
1 TABLET ORAL DAILY
COMMUNITY
Start: 2020-10-01

## 2020-10-26 NOTE — PROGRESS NOTES
Ilia Bellamy is a 72 y.o. male, evaluated via audio-only technology on 10/26/2020 for Follow-up and Medication Refill  . Patient is here for follow-up  He has had multiple doctors visits and complications related to his chronic alcoholic pancreatitis, and a pseudocyst of the pancreas and pancreatic duct stricture. He had a procedure with Dr Juliet Weinstein on September 30    . Procedure: EGD with closure of cystgastrostomy tract  I know he should be following up with his regular gastroenterologist Dr. Jacqueline Houstonh         Patient has been the records notes and labs has been reviewed  Patient   has been complaining of pain and discomfort and he is taking Percocet 1 tablet daily,  Patient needs refill of medications    Medication has been reviewed and updated with patient    I had a long discussion with him regarding long-term narcotic prescription, it needs signed agreement, also a urine drug screen test, and referral for pain management for long-term treatment of pain management medication, I would not prescribe for him medication more than 3 months. Patient was not happy about these steps and he did not think he needs to go through this procedure signing papers and having a urine drug test.             Assessment & Plan:   Diagnoses and all orders for this visit:    1. Alcohol-induced chronic pancreatitis (HCC)    Blood pressures controlled on current medications  No recent hemoglobin A1c on file    -     lipase-protease-amylase (CREON 24,000) 24,000-76,000 -120,000 unit capsule; Take 1 Cap by mouth three (3) times daily (with meals). 2. Essential hypertension  Blood pressures controlled on current medications      -     amLODIPine (NORVASC) 5 mg tablet; Take 1 Tab by mouth daily. 3. Abnormal blood sugar    No recent hemoglobin A1c on file  12  Subjective:       Prior to Admission medications    Medication Sig Start Date End Date Taking?  Authorizing Provider   HYDROcodone-acetaminophen [facility]) 5-325 mg per tablet Take 1 Tab by mouth daily. 10/1/20  Yes Provider, Historical   amLODIPine (NORVASC) 5 mg tablet Take 1 Tab by mouth daily. 10/26/20  Yes Ester Don MD   lipase-protease-amylase (CREON 24,000) 24,000-76,000 -120,000 unit capsule Take 1 Cap by mouth three (3) times daily (with meals). 10/26/20  Yes Ester Don MD   glucose blood VI test strips (ONETOUCH VERIO) strip Use as directed 6/15/18  Yes Tejas Olguin MD   Lancets misc For one touch verio    Use as directed 6/15/18  Yes Tejas Olguin MD   Ursula Martínez by Does Not Apply route. Yes Provider, Historical   LIPASE-PROTEASE-AMYLASE PO Take 1 Tab by mouth three (3) times daily. 10/26/20  Provider, Historical   lipase-protease-amylase (Creon) 12,000-38,000 -60,000 unit capsule Take  by mouth. 6/14/18 10/26/20  Provider, Historical   lipase-protease-amylase (Creon) 24,000-76,000 -120,000 unit capsule Take  by mouth. 10/26/20  Provider, Historical   amLODIPine (NORVASC) 5 mg tablet Take 1 Tab by mouth daily. 1/14/20 10/26/20  Ester Don MD   lipase-protease-amylase (CREON 24,000) 24,000-76,000 -120,000 unit capsule Take 1 Cap by mouth three (3) times daily (with meals). 3/25/19 10/26/20  Ester Don MD   acetaminophen (TYLENOL) 325 mg tablet Take  by mouth every four (4) hours as needed for Pain. 10/26/20  Provider, Historical     Patient Active Problem List   Diagnosis Code    Alcohol-induced chronic pancreatitis ([facility] [address] K86.0    Essential hypertension I10     Patient Active Problem List    Diagnosis Date Noted    Alcohol-induced chronic pancreatitis ([address] 12/10/2018    Essential hypertension 12/10/2018     Current Outpatient Medications   Medication Sig Dispense Refill    HYDROcodone-acetaminophen (NORCO) 5-325 mg per tablet Take 1 Tab by mouth daily.  amLODIPine (NORVASC) 5 mg tablet Take 1 Tab by mouth daily.  90 Tab 0    lipase-protease-amylase (CREON 24,000) 24,000-76,000 -120,000 unit capsule Take 1 Cap by mouth three (3) times daily (with meals). 270 Cap 1    glucose blood VI test strips (ONETOUCH VERIO) strip Use as directed 50 Strip 3    Lancets misc For one touch verio    Use as directed 1 Package 11    Cane robin by Does Not Apply route. Allergies   Allergen Reactions    Milk Containing Products Other (comments)     No past medical history on file. No past surgical history on file. No family history on file. Social History     Tobacco Use    Smoking status: Never Smoker    Smokeless tobacco: Never Used   Substance Use Topics    Alcohol use: No       Review of Systems   Constitutional: Negative for chills, fever, malaise/fatigue and weight loss. HENT: Negative for congestion, ear discharge, ear pain, hearing loss, nosebleeds, sinus pain and sore throat. Eyes: Negative for blurred vision, double vision and discharge. Respiratory: Negative for cough, hemoptysis, sputum production, shortness of breath and wheezing. Cardiovascular: Negative for chest pain, palpitations, claudication and leg swelling. Gastrointestinal: Negative for abdominal pain, constipation, diarrhea, nausea and vomiting. Genitourinary: Negative for dysuria, frequency and urgency. Musculoskeletal: Negative for back pain, falls, joint pain, myalgias and neck pain. Skin: Negative for itching and rash. Neurological: Negative for dizziness, tingling, sensory change, speech change, focal weakness, weakness and headaches. Psychiatric/Behavioral: Negative for depression, hallucinations, substance abuse and suicidal ideas. The patient is not nervous/anxious and does not have insomnia. No flowsheet data found. Ilia Josesito, who was evaluated through a patient-initiated, synchronous (real-time) audio only encounter, and/or her healthcare decision maker, is aware that it is a billable service, with coverage as determined by his insurance carrier. He provided verbal consent to proceed: Yes.  He has not had a related appointment within my department in the past 7 days or scheduled within the next 24 hours.       Total Time: 26 mints     Raegan Burris MD

## 2021-12-15 LAB
CREATININE, EXTERNAL: 0.7
INR, EXTERNAL: 1
PT, EXTERNAL: 11.3

## 2022-03-18 PROBLEM — K86.0 ALCOHOL-INDUCED CHRONIC PANCREATITIS (HCC): Status: ACTIVE | Noted: 2018-12-10

## 2022-03-19 PROBLEM — I10 ESSENTIAL HYPERTENSION: Status: ACTIVE | Noted: 2018-12-10

## 2024-02-27 ENCOUNTER — NEW PATIENT (OUTPATIENT)
Facility: LOCATION | Age: 74
End: 2024-02-27

## 2024-02-27 DIAGNOSIS — E11.9: ICD-10-CM

## 2024-02-27 DIAGNOSIS — H52.03: ICD-10-CM

## 2024-02-27 DIAGNOSIS — H25.13: ICD-10-CM

## 2024-02-27 DIAGNOSIS — Z79.4: ICD-10-CM

## 2024-02-27 DIAGNOSIS — H52.4: ICD-10-CM

## 2024-02-27 DIAGNOSIS — H52.223: ICD-10-CM

## 2024-02-27 PROCEDURE — 92499OPKAL KAL OPTOMAP RETINAL SCREENING, ELECTIVE

## 2024-02-27 PROCEDURE — 92015 DETERMINE REFRACTIVE STATE: CPT

## 2024-02-27 PROCEDURE — 92004 COMPRE OPH EXAM NEW PT 1/>: CPT

## 2024-02-27 ASSESSMENT — KERATOMETRY
OD_K2POWER_DIOPTERS: 43.50
OS_AXISANGLE2_DEGREES: 168
OS_AXISANGLE_DEGREES: 078
OS_K2POWER_DIOPTERS: 45.75
OD_AXISANGLE_DEGREES: 084
OD_K1POWER_DIOPTERS: 43.00
OS_K1POWER_DIOPTERS: 43.00
OD_AXISANGLE2_DEGREES: 174

## 2024-02-27 ASSESSMENT — VISUAL ACUITY
OD_CC: 20/25-2
OD_CC: 20/25
OS_CC: 20/40
OS_CC: 20/25
OU_CC: 20/25
OU_CC: 20/20-2

## 2024-02-27 ASSESSMENT — TONOMETRY
OD_IOP_MMHG: 16
OS_IOP_MMHG: 16